# Patient Record
Sex: FEMALE | Race: ASIAN | NOT HISPANIC OR LATINO
[De-identification: names, ages, dates, MRNs, and addresses within clinical notes are randomized per-mention and may not be internally consistent; named-entity substitution may affect disease eponyms.]

---

## 2017-01-18 ENCOUNTER — APPOINTMENT (OUTPATIENT)
Dept: CARDIOLOGY | Facility: CLINIC | Age: 77
End: 2017-01-18

## 2019-08-29 ENCOUNTER — INPATIENT (INPATIENT)
Facility: HOSPITAL | Age: 79
LOS: 4 days | Discharge: HOME | DRG: 291 | End: 2019-09-03
Attending: INTERNAL MEDICINE | Admitting: INTERNAL MEDICINE
Payer: COMMERCIAL

## 2019-08-29 VITALS
DIASTOLIC BLOOD PRESSURE: 60 MMHG | OXYGEN SATURATION: 98 % | HEART RATE: 88 BPM | WEIGHT: 117.95 LBS | RESPIRATION RATE: 13 BRPM | SYSTOLIC BLOOD PRESSURE: 182 MMHG | TEMPERATURE: 98 F

## 2019-08-29 DIAGNOSIS — D64.9 ANEMIA, UNSPECIFIED: ICD-10-CM

## 2019-08-29 DIAGNOSIS — E11.9 TYPE 2 DIABETES MELLITUS WITHOUT COMPLICATIONS: ICD-10-CM

## 2019-08-29 DIAGNOSIS — Z90.49 ACQUIRED ABSENCE OF OTHER SPECIFIED PARTS OF DIGESTIVE TRACT: Chronic | ICD-10-CM

## 2019-08-29 DIAGNOSIS — I10 ESSENTIAL (PRIMARY) HYPERTENSION: ICD-10-CM

## 2019-08-29 DIAGNOSIS — N17.9 ACUTE KIDNEY FAILURE, UNSPECIFIED: ICD-10-CM

## 2019-08-29 DIAGNOSIS — E78.00 PURE HYPERCHOLESTEROLEMIA, UNSPECIFIED: ICD-10-CM

## 2019-08-29 DIAGNOSIS — I25.10 ATHEROSCLEROTIC HEART DISEASE OF NATIVE CORONARY ARTERY WITHOUT ANGINA PECTORIS: ICD-10-CM

## 2019-08-29 DIAGNOSIS — Z29.9 ENCOUNTER FOR PROPHYLACTIC MEASURES, UNSPECIFIED: ICD-10-CM

## 2019-08-29 DIAGNOSIS — N19 UNSPECIFIED KIDNEY FAILURE: ICD-10-CM

## 2019-08-29 DIAGNOSIS — I50.9 HEART FAILURE, UNSPECIFIED: ICD-10-CM

## 2019-08-29 DIAGNOSIS — Z90.5 ACQUIRED ABSENCE OF KIDNEY: Chronic | ICD-10-CM

## 2019-08-29 LAB
ALBUMIN SERPL ELPH-MCNC: 3 G/DL — LOW (ref 3.3–5)
ALP SERPL-CCNC: 117 U/L — SIGNIFICANT CHANGE UP (ref 40–120)
ALT FLD-CCNC: 39 U/L — SIGNIFICANT CHANGE UP (ref 12–78)
ANION GAP SERPL CALC-SCNC: 11 MMOL/L — SIGNIFICANT CHANGE UP (ref 5–17)
APPEARANCE UR: CLEAR — SIGNIFICANT CHANGE UP
APTT BLD: 38.2 SEC — HIGH (ref 28.5–37)
AST SERPL-CCNC: 38 U/L — HIGH (ref 15–37)
BASOPHILS # BLD AUTO: 0.09 K/UL — SIGNIFICANT CHANGE UP (ref 0–0.2)
BASOPHILS NFR BLD AUTO: 1.1 % — SIGNIFICANT CHANGE UP (ref 0–2)
BILIRUB SERPL-MCNC: 0.4 MG/DL — SIGNIFICANT CHANGE UP (ref 0.2–1.2)
BILIRUB UR-MCNC: NEGATIVE — SIGNIFICANT CHANGE UP
BUN SERPL-MCNC: 51 MG/DL — HIGH (ref 7–23)
CALCIUM SERPL-MCNC: 8.8 MG/DL — SIGNIFICANT CHANGE UP (ref 8.5–10.1)
CHLORIDE SERPL-SCNC: 116 MMOL/L — HIGH (ref 96–108)
CK MB BLD-MCNC: 2.9 % — SIGNIFICANT CHANGE UP (ref 0–3.5)
CK MB BLD-MCNC: 3 % — SIGNIFICANT CHANGE UP (ref 0–3.5)
CK MB CFR SERPL CALC: 7 NG/ML — HIGH (ref 0–3.6)
CK MB CFR SERPL CALC: 7.3 NG/ML — HIGH (ref 0–3.6)
CK SERPL-CCNC: 235 U/L — HIGH (ref 26–192)
CK SERPL-CCNC: 256 U/L — HIGH (ref 26–192)
CO2 SERPL-SCNC: 18 MMOL/L — LOW (ref 22–31)
COLOR SPEC: SIGNIFICANT CHANGE UP
CREAT SERPL-MCNC: 3.7 MG/DL — HIGH (ref 0.5–1.3)
DIFF PNL FLD: ABNORMAL
EOSINOPHIL # BLD AUTO: 0.61 K/UL — HIGH (ref 0–0.5)
EOSINOPHIL NFR BLD AUTO: 7.5 % — HIGH (ref 0–6)
GLUCOSE SERPL-MCNC: 155 MG/DL — HIGH (ref 70–99)
GLUCOSE UR QL: 50 MG/DL
HCT VFR BLD CALC: 30.7 % — LOW (ref 34.5–45)
HGB BLD-MCNC: 9.2 G/DL — LOW (ref 11.5–15.5)
IMM GRANULOCYTES NFR BLD AUTO: 0.5 % — SIGNIFICANT CHANGE UP (ref 0–1.5)
INR BLD: 1.08 RATIO — SIGNIFICANT CHANGE UP (ref 0.88–1.16)
KETONES UR-MCNC: NEGATIVE — SIGNIFICANT CHANGE UP
LACTATE SERPL-SCNC: 1.3 MMOL/L — SIGNIFICANT CHANGE UP (ref 0.7–2)
LEUKOCYTE ESTERASE UR-ACNC: NEGATIVE — SIGNIFICANT CHANGE UP
LYMPHOCYTES # BLD AUTO: 1.39 K/UL — SIGNIFICANT CHANGE UP (ref 1–3.3)
LYMPHOCYTES # BLD AUTO: 17.1 % — SIGNIFICANT CHANGE UP (ref 13–44)
MCHC RBC-ENTMCNC: 27.1 PG — SIGNIFICANT CHANGE UP (ref 27–34)
MCHC RBC-ENTMCNC: 30 GM/DL — LOW (ref 32–36)
MCV RBC AUTO: 90.6 FL — SIGNIFICANT CHANGE UP (ref 80–100)
MONOCYTES # BLD AUTO: 0.57 K/UL — SIGNIFICANT CHANGE UP (ref 0–0.9)
MONOCYTES NFR BLD AUTO: 7 % — SIGNIFICANT CHANGE UP (ref 2–14)
NEUTROPHILS # BLD AUTO: 5.43 K/UL — SIGNIFICANT CHANGE UP (ref 1.8–7.4)
NEUTROPHILS NFR BLD AUTO: 66.8 % — SIGNIFICANT CHANGE UP (ref 43–77)
NITRITE UR-MCNC: NEGATIVE — SIGNIFICANT CHANGE UP
NRBC # BLD: 0 /100 WBCS — SIGNIFICANT CHANGE UP (ref 0–0)
NT-PROBNP SERPL-SCNC: HIGH PG/ML (ref 0–450)
PH UR: 5 — SIGNIFICANT CHANGE UP (ref 5–8)
PLATELET # BLD AUTO: 366 K/UL — SIGNIFICANT CHANGE UP (ref 150–400)
POTASSIUM SERPL-MCNC: 5 MMOL/L — SIGNIFICANT CHANGE UP (ref 3.5–5.3)
POTASSIUM SERPL-SCNC: 5 MMOL/L — SIGNIFICANT CHANGE UP (ref 3.5–5.3)
PROT SERPL-MCNC: 7.4 G/DL — SIGNIFICANT CHANGE UP (ref 6–8.3)
PROT UR-MCNC: 500 MG/DL
PROTHROM AB SERPL-ACNC: 12.3 SEC — SIGNIFICANT CHANGE UP (ref 10–12.9)
RBC # BLD: 3.39 M/UL — LOW (ref 3.8–5.2)
RBC # FLD: 18.3 % — HIGH (ref 10.3–14.5)
SODIUM SERPL-SCNC: 145 MMOL/L — SIGNIFICANT CHANGE UP (ref 135–145)
SP GR SPEC: 1 — LOW (ref 1.01–1.02)
TROPONIN I SERPL-MCNC: 0.08 NG/ML — HIGH (ref 0.01–0.04)
TROPONIN I SERPL-MCNC: 0.09 NG/ML — HIGH (ref 0.01–0.04)
TROPONIN I SERPL-MCNC: 0.09 NG/ML — HIGH (ref 0.01–0.04)
UROBILINOGEN FLD QL: NEGATIVE — SIGNIFICANT CHANGE UP
WBC # BLD: 8.13 K/UL — SIGNIFICANT CHANGE UP (ref 3.8–10.5)
WBC # FLD AUTO: 8.13 K/UL — SIGNIFICANT CHANGE UP (ref 3.8–10.5)

## 2019-08-29 PROCEDURE — 99223 1ST HOSP IP/OBS HIGH 75: CPT

## 2019-08-29 PROCEDURE — 71045 X-RAY EXAM CHEST 1 VIEW: CPT | Mod: 26

## 2019-08-29 PROCEDURE — 93010 ELECTROCARDIOGRAM REPORT: CPT

## 2019-08-29 PROCEDURE — 99285 EMERGENCY DEPT VISIT HI MDM: CPT

## 2019-08-29 PROCEDURE — 93306 TTE W/DOPPLER COMPLETE: CPT | Mod: 26

## 2019-08-29 RX ORDER — DEXTROSE 50 % IN WATER 50 %
25 SYRINGE (ML) INTRAVENOUS ONCE
Refills: 0 | Status: DISCONTINUED | OUTPATIENT
Start: 2019-08-29 | End: 2019-09-03

## 2019-08-29 RX ORDER — GLUCAGON INJECTION, SOLUTION 0.5 MG/.1ML
1 INJECTION, SOLUTION SUBCUTANEOUS ONCE
Refills: 0 | Status: DISCONTINUED | OUTPATIENT
Start: 2019-08-29 | End: 2019-09-03

## 2019-08-29 RX ORDER — ASPIRIN/CALCIUM CARB/MAGNESIUM 324 MG
1 TABLET ORAL
Qty: 0 | Refills: 0 | DISCHARGE

## 2019-08-29 RX ORDER — ATORVASTATIN CALCIUM 80 MG/1
80 TABLET, FILM COATED ORAL AT BEDTIME
Refills: 0 | Status: DISCONTINUED | OUTPATIENT
Start: 2019-08-29 | End: 2019-09-03

## 2019-08-29 RX ORDER — HYDRALAZINE HCL 50 MG
50 TABLET ORAL
Qty: 0 | Refills: 0 | DISCHARGE

## 2019-08-29 RX ORDER — AMLODIPINE BESYLATE 2.5 MG/1
10 TABLET ORAL DAILY
Refills: 0 | Status: DISCONTINUED | OUTPATIENT
Start: 2019-08-29 | End: 2019-09-03

## 2019-08-29 RX ORDER — FUROSEMIDE 40 MG
40 TABLET ORAL ONCE
Refills: 0 | Status: COMPLETED | OUTPATIENT
Start: 2019-08-29 | End: 2019-08-29

## 2019-08-29 RX ORDER — HYDRALAZINE HCL 50 MG
0 TABLET ORAL
Qty: 0 | Refills: 0 | DISCHARGE

## 2019-08-29 RX ORDER — HYDRALAZINE HCL 50 MG
50 TABLET ORAL THREE TIMES A DAY
Refills: 0 | Status: DISCONTINUED | OUTPATIENT
Start: 2019-08-29 | End: 2019-09-03

## 2019-08-29 RX ORDER — ASPIRIN/CALCIUM CARB/MAGNESIUM 324 MG
81 TABLET ORAL DAILY
Refills: 0 | Status: DISCONTINUED | OUTPATIENT
Start: 2019-08-29 | End: 2019-09-03

## 2019-08-29 RX ORDER — INSULIN GLARGINE 100 [IU]/ML
3 INJECTION, SOLUTION SUBCUTANEOUS
Qty: 0 | Refills: 0 | DISCHARGE

## 2019-08-29 RX ORDER — HYDRALAZINE HCL 50 MG
50 TABLET ORAL THREE TIMES A DAY
Refills: 0 | Status: DISCONTINUED | OUTPATIENT
Start: 2019-08-29 | End: 2019-08-29

## 2019-08-29 RX ORDER — DEXTROSE 50 % IN WATER 50 %
15 SYRINGE (ML) INTRAVENOUS ONCE
Refills: 0 | Status: DISCONTINUED | OUTPATIENT
Start: 2019-08-29 | End: 2019-09-03

## 2019-08-29 RX ORDER — AMLODIPINE BESYLATE 2.5 MG/1
1 TABLET ORAL
Qty: 0 | Refills: 0 | DISCHARGE

## 2019-08-29 RX ORDER — ATORVASTATIN CALCIUM 80 MG/1
1 TABLET, FILM COATED ORAL
Qty: 0 | Refills: 0 | DISCHARGE

## 2019-08-29 RX ORDER — INSULIN LISPRO 100/ML
VIAL (ML) SUBCUTANEOUS AT BEDTIME
Refills: 0 | Status: DISCONTINUED | OUTPATIENT
Start: 2019-08-29 | End: 2019-09-03

## 2019-08-29 RX ORDER — SODIUM CHLORIDE 9 MG/ML
1000 INJECTION, SOLUTION INTRAVENOUS
Refills: 0 | Status: DISCONTINUED | OUTPATIENT
Start: 2019-08-29 | End: 2019-09-03

## 2019-08-29 RX ORDER — FUROSEMIDE 40 MG
40 TABLET ORAL EVERY 12 HOURS
Refills: 0 | Status: DISCONTINUED | OUTPATIENT
Start: 2019-08-29 | End: 2019-08-31

## 2019-08-29 RX ORDER — AMLODIPINE BESYLATE 2.5 MG/1
10 TABLET ORAL DAILY
Refills: 0 | Status: DISCONTINUED | OUTPATIENT
Start: 2019-08-29 | End: 2019-08-29

## 2019-08-29 RX ORDER — CLOPIDOGREL BISULFATE 75 MG/1
1 TABLET, FILM COATED ORAL
Qty: 0 | Refills: 0 | DISCHARGE

## 2019-08-29 RX ORDER — ENOXAPARIN SODIUM 100 MG/ML
30 INJECTION SUBCUTANEOUS DAILY
Refills: 0 | Status: DISCONTINUED | OUTPATIENT
Start: 2019-08-29 | End: 2019-08-30

## 2019-08-29 RX ORDER — CLOPIDOGREL BISULFATE 75 MG/1
0 TABLET, FILM COATED ORAL
Qty: 0 | Refills: 0 | DISCHARGE

## 2019-08-29 RX ORDER — INSULIN LISPRO 100/ML
VIAL (ML) SUBCUTANEOUS
Refills: 0 | Status: DISCONTINUED | OUTPATIENT
Start: 2019-08-29 | End: 2019-09-03

## 2019-08-29 RX ORDER — DEXTROSE 50 % IN WATER 50 %
12.5 SYRINGE (ML) INTRAVENOUS ONCE
Refills: 0 | Status: DISCONTINUED | OUTPATIENT
Start: 2019-08-29 | End: 2019-09-03

## 2019-08-29 RX ADMIN — Medication 40 MILLIGRAM(S): at 21:40

## 2019-08-29 RX ADMIN — Medication 40 MILLIGRAM(S): at 10:43

## 2019-08-29 RX ADMIN — AMLODIPINE BESYLATE 10 MILLIGRAM(S): 2.5 TABLET ORAL at 16:18

## 2019-08-29 RX ADMIN — ATORVASTATIN CALCIUM 80 MILLIGRAM(S): 80 TABLET, FILM COATED ORAL at 21:40

## 2019-08-29 RX ADMIN — Medication 50 MILLIGRAM(S): at 21:40

## 2019-08-29 NOTE — CONSULT NOTE ADULT - ASSESSMENT
Assessment/Plan:  79y Female    Mindy Lozoyaad [facility]  Cardiology  Spectra #[phone] Assessment/Plan:  This is a 78 y/o female current smoker with Hx of non-Hodgkin's lymphoma (last chemo in 2009), right kidney Ca s/p nephrectomy (2009), CAD s/p stents to the RCA and LAD x 2 (20019), HTN, DM2, HLD, and CKD stage 4 (s/p left AVG  not has been used) presented to the ED c/o worsening SOb, MO, and orthopnea.  denies CP or palpitations.  Denies nausea, vomiting, or diarrhea.  Denies fever or cough.  Admits to have just arrived less that a week ago from [place] and that her SOB and MO started while she was there, though, not as bad.     HF with unknown EF  - She had a remote TTE (2009) showing a normal LVSF with no significant valvular pathology  - Her CxR, pro-BNP, and clinical exam are consistent of volume overload.  This is probably aggravated by her elevated BP  - She is s/p Lasix 40 mg IV x 1 in the ED.  Will aggressively diurese for now and reassess volume status closely  - Monitor strict I&O's  - Daily standing weights only  - Monitor electrolytes, replete to keep K>4 and Mag>2  - Obtain TTE    CAD  - She has no acute anginal symptoms  - She is s/p stents (RCA, LAD x 2) in 2009  - Continue ASA  - Will not resume Plavix, although, she has been taking it since the PCI and just recently stopped it  - Continue statin  - Her troponin is slightly elevated at .084.  Continue to trend x 2 more.  This is likely in the setting of her CKD and volume overload    CKD  - Her outpatient creat in 7/2019 =4.9; 8/2019 =3.13.  Here in ED=3.7  - Continue to monitor renal indices  - Avoid nephrotoxics  - She had an AV graft that has not been used yet  - Renal following.  Per Renal, no indication for HD right now.    HTN  - Continue Hydralazine 50mg q8H (home med) for afterload reduction.  May increase if BP remains elevated    HLD  - Continue statin  - Will obtain FLP    DM2  - Obtain HgbA1C  - FS AC and HS  - Care per Primary    Anemia  - She admits to a +FOBT (outpatient)  - She was scheduled for an outpatient colonoscopy  - She presented with Hgb of 9.2.  Will continue to trend.  - Recommend GI eval  - Will continue ASA for now in setting of cardiac stents.  In the event that she is significant bleeding or drop in Hgb, then will hold but will resume as soon as GI clears    DVT ppx  - Per Primary    [facility]  Cardiology  Spectra #[phone] Assessment/Plan:  This is a 80 y/o female current smoker with Hx of non-Hodgkin's lymphoma (last chemo in 2009), right kidney Ca s/p nephrectomy (2009), CAD s/p stents to the RCA and LAD x 2 (20019), HTN, DM2, HLD, and CKD stage 4 (s/p left AVG  not has been used) presented to the ED c/o worsening SOb, MO, and orthopnea.  denies CP or palpitations.  Denies nausea, vomiting, or diarrhea.  Denies fever or cough.  Admits to have just arrived less that a week ago from [place] and that her SOB and MO started while she was there, though, not as bad.     HF with unknown EF  - She had a remote TTE (2009) showing a normal LVSF with no significant valvular pathology  - Her CxR, pro-BNP, and clinical exam are consistent of volume overload.  This is probably aggravated by her elevated BP  - She is s/p Lasix 40 mg IV x 1 in the ED.  Will aggressively diurese for now and reassess volume status closely.  Agree with Lasix 40 mg IV q12H  - Monitor strict I&O's  - Daily standing weights only  - Monitor electrolytes, replete to keep K>4 and Mag>2  - Obtain TTE  - Can be ruled out for DVT/PE, though, doubt.  Can add    CAD  - She has no acute anginal symptoms  - She is s/p stents (RCA, LAD x 2) in 2009  - Continue ASA  - Will not resume Plavix, although, she has been taking it since the PCI and just recently stopped it  - Continue statin  - Her troponin is slightly elevated at .084.  Continue to trend x 2 more.  This is likely in the setting of her CKD and volume overload    CKD  - Her outpatient creat in 7/2019 =4.9; 8/2019 =3.13.  Here in ED=3.7  - Continue to monitor renal indices  - Avoid nephrotoxics  - She had an AV graft that has not been used yet  - Renal following.  Per Renal, no indication for HD right now.    HTN  - Continue Hydralazine 50mg q8H (home med) for afterload reduction.  May increase if BP remains elevated    HLD  - Continue statin  - Will obtain FLP    DM2  - Obtain HgbA1C  - FS AC and HS  - Care per Primary    Anemia  - She admits to a +FOBT (outpatient)  - She was scheduled for an outpatient colonoscopy  - She presented with Hgb of 9.2.  Will continue to trend.  - Recommend GI eval  - Will continue ASA for now in setting of cardiac stents.  In the event that she is significant bleeding or drop in Hgb, then will hold but will resume as soon as GI clears    DVT ppx  - Per Primary    Mindy Young [facility]  Cardiology  Spectra #[phone] Assessment/Plan:  This is a 80 y/o female current smoker with Hx of non-Hodgkin's lymphoma (last chemo in 2009), right kidney Ca s/p nephrectomy (2009), CAD s/p stents to the RCA and LAD x 2 (20019), HTN, DM2, HLD, and CKD stage 4 (s/p left AVG not has been used) presented to the ED c/o worsening SOB, MO, and orthopnea.  Admits to have just arrived less that a week ago from [place] and that her SOB and MO started while she was there.    HF with unknown EF  - She had a remote TTE (2009) showing a normal LVSF with no significant valvular pathology  - Her CxR, pro-BNP, and clinical exam are consistent of volume overload.  This is probably aggravated by her elevated BP  - She is s/p Lasix 40 mg IV x 1 in the ED.  Will aggressively diurese for now and reassess volume status closely.  Agree with Lasix 40 mg IV q12H  - Monitor strict I&O's  - Daily standing weights only  - Monitor electrolytes, replete to keep K>4 and Mag>2  - Obtain TTE  - Can be ruled out for DVT/PE, though, doubt.      CAD  - She has no acute anginal symptoms  - She is s/p stents (RCA, LAD x 2) in 2009  - Continue ASA  - Will not resume Plavix, although, she has been taking it since the PCI and just recently stopped it  - Continue statin  - Her troponin is slightly elevated at .084.  Continue to trend x 2 more.  This is likely in the setting of her CKD and volume overload    CKD  - Her outpatient creat in 7/2019 =4.9; 8/2019 =3.13.  Here in ED=3.7  - Continue to monitor renal indices  - Avoid nephrotoxics  - She had an AV graft that has not been used yet  - Renal following.  Per Renal, no indication for HD right now.    HTN  - Continue Hydralazine 50mg q8H (home med) for afterload reduction.  May increase if BP remains elevated    HLD  - Continue statin  - Will obtain FLP    DM2  - Obtain HgbA1C  - FS AC and HS  - Care per Primary    Anemia  - She admits to a +FOBT (outpatient)  - She was scheduled for an outpatient colonoscopy  - She presented with Hgb of 9.2.  Will continue to trend.  - Recommend GI eval  - Will continue ASA for now in setting of cardiac stents.  In the event that she significantly bleeds or drop in Hgb, then will hold but will resume as soon as GI clears    DVT ppx  - Per Primary    Mindy Young [facility]  Cardiology  Spectra #[phone]

## 2019-08-29 NOTE — ED PROVIDER NOTE - CLINICAL SUMMARY MEDICAL DECISION MAKING FREE TEXT BOX
CRF with SOB for several days. Predialysis. Plan - CXR, labs, lasix, renal consult. CRF with SOB for several days. Predialysis. Plan - CXR, labs, lasix, renal consult.  Will hold off on sepsis fluids due to renal failure.

## 2019-08-29 NOTE — CONSULT NOTE ADULT - ASSESSMENT
1.	CKD 4  2.	Dyspnea, Fluid overload  3.	Diabetes  4.	Hypertension  5.	Anemia    IV lasix. To continue PO lasix as out pt. Monitor blood sugar levels. Insulin coverage as needed. Dietary restriction.   Monitor BP trend. Titrate BP meds as needed. Salt restriction. Monitor h/h trend. Check iron studies if not done recently. Transfuse PRBC's PRN.   Will follow electrolytes and renal function trend. No indication for RRT at this time.   Further recommendations pending clinical course. Thank you for the courtesy of this referral.

## 2019-08-29 NOTE — H&P ADULT - HISTORY OF PRESENT ILLNESS
Patient is a 79y old  Female who presents with a chief complaint of volume overload (29 Aug 2019 12:52)      FROM ADMISSION H+P:   HPI:      ----  INTERVAL HPI/OVERNIGHT EVENTS: Pt seen and evaluated at the bedside. No acute overnight events occurred.     ----  PAST MEDICAL & SURGICAL HISTORY:  Non-Hodgkins lymphoma  High cholesterol  DM (diabetes mellitus)  HTN (hypertension)  Renal failure      FAMILY HISTORY:      Allergies    No Known Allergies    Intolerances        ----  REVIEW OF SYSTEMS:  CONSTITUTIONAL: denies fever, chills, fatigue, weakness  HEENT: denies blurred vision, sore throat  SKIN: denies new lesions, rash  CARDIOVASCULAR: denies chest pain, chest pressure, palpitations  RESPIRATORY: denies shortness of breath, sputum production  GASTROINTESTINAL: denies nausea, vomiting, diarrhea, abdominal pain  GENITOURINARY: denies dysuria, discharge  NEUROLOGICAL: denies numbness, headache, focal weakness  MUSCULOSKELETAL: denies new joint pain, muscle aches  HEMATOLOGIC: denies gross bleeding, bruising  LYMPHATICS: denies enlarged lymph nodes, extremity swelling  PSYCHIATRIC: denies recent changes in anxiety, depression  ENDOCRINOLOGIC: denies sweating, cold or heat intolerance    ----  PHYSICAL EXAM:  GENERAL: patient appears well, no acute distress, appropriately interactive  EYES: sclera clear, no exudates  ENMT: oropharynx clear without erythema, moist mucous membranes  NECK: supple, soft, no thyromegaly noted  LUNGS: good air entry bilaterally, clear to auscultation, symmetric breath sounds, no wheezing or rhonchi appreciated  HEART: soft S1/S2, regular rate and rhythm, no murmurs noted, no noted edema to b/l LE  GASTROINTESTINAL: abdomen is soft, nontender, nondistended, normoactive bowel sounds, no palpable masses  INTEGUMENT: good skin turgor, appropriate for ethnicity, appears well perfused, no jaundice noted  MUSCULOSKELETAL: no clubbing or cyanosis, no obvious deformity  NEUROLOGIC: awake, alert, oriented x3, good muscle tone in 4 extremities, no obvious sensory deficits  PSYCHIATRIC: mood is good, affect is congruent with mood, linear and logical thought process  HEME/LYMPH: no palpable supraclavicular nodules, no obvious ecchymosis     T(C): 36.7 (08-29-19 @ 14:54), Max: 36.7 (08-29-19 @ 14:54)  HR: 78 (08-29-19 @ 14:54) (78 - 88)  BP: 170/76 (08-29-19 @ 14:54) (168/78 - 182/60)  RR: 16 (08-29-19 @ 14:54) (13 - 16)  SpO2: 97% (08-29-19 @ 14:54) (97% - 98%)  Wt(kg): --    ----  I&O's Summary      LABS:                        9.2    8.13  )-----------( 366      ( 29 Aug 2019 10:30 )             30.7     08-29    145  |  116<H>  |  51<H>  ----------------------------<  155<H>  5.0   |  18<L>  |  3.70<H>    Ca    8.8      29 Aug 2019 10:30    TPro  7.4  /  Alb  3.0<L>  /  TBili  0.4  /  DBili  x   /  AST  38<H>  /  ALT  39  /  AlkPhos  117  08-29    PT/INR - ( 29 Aug 2019 10:30 )   PT: 12.3 sec;   INR: 1.08 ratio         PTT - ( 29 Aug 2019 10:30 )  PTT:38.2 sec    CAPILLARY BLOOD GLUCOSE Pt is a 78 yo F w/ PMH Non-Hodgkins lymphoma, High cholesterol, DM (on insulin),HTN, chronic Renal failure, angioplasty 2009, removal of R kidney and gall bladder 2011, chemo 2011 that presents to [facility] ED c/o SOB for the past 3-4 days. She explains that she has been traveling recently. She just got back from [place]. Her shortness of breath began during her trip. She explains she has never had this before. She was having trouble walking long distances before becoming out of breath and needing to stop walking. She rates the SOB 7/10. She also admits to leg swelling, LE spasms/pain, orthopnea and palpations. She denies abdominal pain, chest pain, dyspnea, numbness, tingling, syncope, dizziness. Pt is a 78 yo F w/ PMH Non-Hodgkins lymphoma, High cholesterol, DM (on insulin),HTN, chronic Renal failure, angioplasty 2009, removal of R kidney and gall bladder 2011, chemo 2011 that presents to [facility] ED c/o SOB for the past 3-4 days. She explains that she has been traveling recently. She just got back from [place]. Her shortness of breath began during her trip. She explains she has never had this before. She was having trouble walking long distances before becoming out of breath and needing to stop walking. She rates the SOB 7/10. She also admits to leg swelling, LE spasms/pain, orthopnea and palpations. She denies abdominal pain, chest pain, dyspnea, numbness, tingling, syncope, dizziness.     In ED /60, HR 88, SPO2 98 on room air. No leukocytosis, afebrile.   BUN 51, Cr 3.7, Lactate 1.3, BNP 16,687, trop .084, H/H 9.2/30.7  EKG: Sinus rhythm, PVCs, PACs. LVH. LA enlargement.   CXR: small b/l pleural effusions and bibasilar atelectasis. Pt is a 80 yo F w/ PMH Non-Hodgkins lymphoma, High cholesterol, DM (on insulin),HTN, chronic Renal failure, angioplasty 2009, removal of R kidney and gall bladder 2011, chemo 2011 that presents to [facility] ED c/o SOB for the past 3-4 days. She explains that she has been traveling recently. She just got back from [place]. Her shortness of breath began during her trip. She explains she has never had this before. She was having trouble walking long distances before becoming out of breath and needing to stop walking. She rates the SOB 7/10. She also admits to leg swelling, LE spasms/pain, orthopnea and palpations. She recently had a positive FOBT outpatient and was scheduled for a colonoscopy but has yet to receive it. She denies abdominal pain, chest pain, dyspnea, numbness, tingling, syncope, dizziness.     In ED /60, HR 88, SPO2 98 on room air. No leukocytosis, afebrile.   BUN 51, Cr 3.7, Lactate 1.3, BNP 16,687, trop .084, H/H 9.2/30.7  EKG: Sinus rhythm, PVCs, PACs. LVH. LA enlargement.   CXR: small b/l pleural effusions and bibasilar atelectasis.

## 2019-08-29 NOTE — H&P ADULT - PROBLEM SELECTOR PLAN 1
-Pt BNP 16,687 in ED  -Pt appears fluid overloaded w/ orthopnea, SOB and LE pitting edema.  -CXR revealed b/l pleaural effusion and bibasilar atelectasis.  -Cardio consulted, Dr. Franco  -Cardio rec IV 40mg Lasix Q 12.  -Cardio rec obtain TTE, previous was 2009 w/ EF 75. -SOB likely 2/2 to acute CHF exacerbation, pt has no history of CHF.   -Previous TTE in 2009, EF 75%  -Pt BNP 16,687 in ED  -Pt appears fluid overloaded w/ orthopnea, SOB and LE pitting edema.  -CXR revealed b/l pleural effusion and bibasilar atelectasis.  -Cardio consulted, Dr. Franco  -Cardio rec IV 40mg Lasix Q 12, appreciate recs.  -Cardio rec obtain TTE -SOB likely 2/2 to acute CHF exacerbation, pt has no history of CHF.   -Previous TTE in 2009, EF 75%  -Pt BNP 16,687 in ED  -Pt appears fluid overloaded w/ orthopnea, SOB and LE pitting edema.  -CXR revealed b/l pleural effusion and bibasilar atelectasis.  -Cardio consulted, Dr. Franco  -Cardio rec IV 40mg Lasix Q 12, appreciate recs.  -Cardio rec obtain TTE, f/u results.

## 2019-08-29 NOTE — H&P ADULT - NSHPSOCIALHISTORY_GEN_ALL_CORE
Pt lives at home with daughter. Recent travel. Current smoker, 2-3 cigarettes per day. No alcohol or drug use. Ambulates independently w/ walker.

## 2019-08-29 NOTE — H&P ADULT - NSICDXPASTMEDICALHX_GEN_ALL_CORE_FT
PAST MEDICAL HISTORY:  CAD S/P percutaneous coronary angioplasty     DM (diabetes mellitus)     High cholesterol     HTN (hypertension)     Non-Hodgkins lymphoma     Renal failure

## 2019-08-29 NOTE — H&P ADULT - PROBLEM SELECTOR PLAN 7
-continue home dose asa  -do not restart Clopidogrel as per cardio  -trop was 0.084, trending. -continue home dose asa  -do not restart Clopidogrel as per cardio  -trop was 0.084, trending.  -repeat EKG AM -continue home dose asa  -do not restart Clopidogrel as per cardio  -trop was 0.084, trending cardiac enzymes Q6 x2, f/u results.   -repeat EKG AM

## 2019-08-29 NOTE — H&P ADULT - PROBLEM SELECTOR PLAN 2
-BUN/Cr: 51/3.7, baseline 7/2019 3-5.   -Acute on chronic renal failure  -Monitor BMP  -Nephro consulted, f/u recs  -avoid nephrotoxic meds -BUN/Cr: 51/3.7, baseline 7/2019 3-5.   -Acute on chronic renal failure  -Renal diet, fluid restriction to 2 L  -Monitor BMP, Cr, K in AM  -Nephro consulted, f/u recs  -avoid nephrotoxic meds -BUN/Cr: 51/3.7, baseline 7/2019 ranges from 3-5.   -Acute on chronic renal failure  -Renal diet, fluid restriction to 2 L  -Monitor BMP, Cr, K in AM  -Nephro consulted, f/u recs  -avoid nephrotoxic meds

## 2019-08-29 NOTE — ED PROVIDER NOTE - CHPI ED SYMPTOMS NEG
no vomiting/no chills/no nausea/no syncope/no back pain/no chest pain/no cough/no dizziness/no fever/no diaphoresis

## 2019-08-29 NOTE — ED PROVIDER NOTE - OBJECTIVE STATEMENT
79 F with CRF, SP nephrectomy, has AV graft in left arm which hasn't been used yet, co SOB for 3 days. Denies fever, cough, CP, N, V, or other symptom.   PCP Hallie Jeffery

## 2019-08-29 NOTE — H&P ADULT - PROBLEM SELECTOR PLAN 8
-DVT prophylaxis, SCDs for now due to positive FOBT.  IMPROVE VTE Individual Risk Assessment    RISK                                                                Points  [  ] Previous VTE                                                  3  [  ] Thrombophilia                                               2  [  ] Lower limb paralysis                                      2        (unable to hold up >15 seconds)    [  ] Current Cancer                                              2         (within 6 months)  [  ] Immobilization > 24 hrs                                1  [  ] ICU/CCU stay > 24 hours                              1  [  ] Age > 60                                                      1  IMPROVE VTE Score ____1_____  IMPROVE Score 0-1: Low Risk, No VTE prophylaxis required for most patients, encourage ambulation.   IMPROVE Score 2-3: At risk, pharmacologic VTE prophylaxis is indicated for most patients (in the absence of a contraindication)  IMPROVE Score > or = 4: High Risk, pharmacologic VTE prophylaxis is indicated for most patients (in the absence of a contraindication) -DVT prophylaxis, Lovenox 30 mg  IMPROVE VTE Individual Risk Assessment  RISK                                                                Points  [  ] Previous VTE                                                  3  [  ] Thrombophilia                                               2  [  ] Lower limb paralysis                                      2        (unable to hold up >15 seconds)    [  ] Current Cancer                                              2         (within 6 months)  [  ] Immobilization > 24 hrs                                1  [  ] ICU/CCU stay > 24 hours                              1  [  ] Age > 60                                                      1  IMPROVE VTE Score ____1_____  IMPROVE Score 0-1: Low Risk, No VTE prophylaxis required for most patients, encourage ambulation.   IMPROVE Score 2-3: At risk, pharmacologic VTE prophylaxis is indicated for most patients (in the absence of a contraindication)  IMPROVE Score > or = 4: High Risk, pharmacologic VTE prophylaxis is indicated for most patients (in the absence of a contraindication) -DVT prophylaxis renal dosing, Lovenox 30 mg  IMPROVE VTE Individual Risk Assessment  RISK                                                                Points  [  ] Previous VTE                                                  3  [  ] Thrombophilia                                               2  [  ] Lower limb paralysis                                      2        (unable to hold up >15 seconds)    [  ] Current Cancer                                              2         (within 6 months)  [  ] Immobilization > 24 hrs                                1  [  ] ICU/CCU stay > 24 hours                              1  [  ] Age > 60                                                      1  IMPROVE VTE Score ____1_____  IMPROVE Score 0-1: Low Risk, No VTE prophylaxis required for most patients, encourage ambulation.   IMPROVE Score 2-3: At risk, pharmacologic VTE prophylaxis is indicated for most patients (in the absence of a contraindication)  IMPROVE Score > or = 4: High Risk, pharmacologic VTE prophylaxis is indicated for most patients (in the absence of a contraindication)

## 2019-08-29 NOTE — ED PROVIDER NOTE - CARE PLAN
Principal Discharge DX:	Renal failure Principal Discharge DX:	Renal failure  Secondary Diagnosis:	CHF (congestive heart failure)

## 2019-08-29 NOTE — H&P ADULT - PROBLEM SELECTOR PLAN 6
-continue home dose amlodipine, hydralazine.  -monitor bp -continue home dose amlodipine, hydralazine w/ hold parameters.  -monitor bp

## 2019-08-29 NOTE — CONSULT NOTE ADULT - SUBJECTIVE AND OBJECTIVE BOX
[facility] - Flaca Summers, Missael Lombardo, Adan Roper Savella, [place]  Office Number: [phone]    Initial Consult Note:     CHIEF COMPLAINT: Patient is a 79y old  Female who presents with a chief complaint of     HPI:    PAST MEDICAL & SURGICAL HISTORY:  Non-Hodgkins lymphoma  High cholesterol  DM (diabetes mellitus)  HTN (hypertension)  Renal failure    SOCIAL HISTORY:  No tobacco, ethanol, or drug abuse.  FAMILY HISTORY:    No family history of acute MI or sudden cardiac death.  MEDICATIONS  (STANDING):    MEDICATIONS  (PRN):    Allergies    No Known Allergies    Intolerances    REVIEW OF SYSTEMS:  CONSTITUTIONAL: No weakness, fevers or chills  EYES/ENT: No visual changes;  No vertigo or throat pain   NECK: No pain or stiffness  RESPIRATORY: No cough, wheezing, hemoptysis; No shortness of breath  CARDIOVASCULAR: No chest pain or palpitations  GASTROINTESTINAL: No abdominal pain. No nausea, vomiting, or hematemesis; No diarrhea or constipation. No melena or hematochezia.  GENITOURINARY: No dysuria, frequency or hematuria  NEUROLOGICAL: No numbness or weakness  SKIN: No itching or rash  All other review of systems is negative unless indicated above  VITAL SIGNS:   Vital Signs Last 24 Hrs  T(C): 36.6 (29 Aug 2019 09:36), Max: 36.6 (29 Aug 2019 09:36)  T(F): 97.8 (29 Aug 2019 09:36), Max: 97.8 (29 Aug 2019 09:36)  HR: 84 (29 Aug 2019 10:00) (84 - 88)  BP: 168/78 (29 Aug 2019 10:00) (168/78 - 182/60)  BP(mean): --  RR: 14 (29 Aug 2019 10:00) (13 - 14)  SpO2: 97% (29 Aug 2019 10:00) (97% - 98%)  I&O's Summary    On Exam:  Constitutional: NAD, alert and oriented x 3  Lungs:  Non-labored, breath sounds are clear bilaterally, No wheezing, rales or rhonchi  Cardiovascular: RRR.  S1 and S2 positive.  No murmurs, rubs, gallops or clicks  Gastrointestinal: Bowel Sounds present, soft, nontender.   Lymph: No peripheral edema. No cervical lymphadenopathy.  Neurological: Alert, no focal deficits  Skin: No rashes or ulcers   Psych:  Mood & affect appropriate.    LABS: All Labs Reviewed:                        9.2    8.13  )-----------( 366      ( 29 Aug 2019 10:30 )             30.7     29 Aug 2019 10:30    145    |  116    |  51     ----------------------------<  155    5.0     |  18     |  3.70     Ca    8.8        29 Aug 2019 10:30    TPro  7.4    /  Alb  3.0    /  TBili  0.4    /  DBili  x      /  AST  38     /  ALT  39     /  AlkPhos  117    29 Aug 2019 10:30    PT/INR - ( 29 Aug 2019 10:30 )   PT: 12.3 sec;   INR: 1.08 ratio       PTT - ( 29 Aug 2019 10:30 )  PTT:38.2 sec  CARDIAC MARKERS ( 29 Aug 2019 10:30 )  .084 ng/mL / x     / x     / x     / x        Blood Culture:   08-29 @ 10:30  Pro Bnp [id number]    RADIOLOGY:  < from: Xray Chest 1 View- PORTABLE-Urgent (08.29.19 @ 10:18) >    EXAM:  XR CHEST PORTABLE URGENT 1V                          PROCEDURE DATE:  08/29/2019      INTERPRETATION:  CLINICAL INFORMATION: Shortness of breath.    TECHNIQUE: AP portable view of the chest    COMPARISON: 1/5/2012    FINDINGS:    The cardiac silhouette is enlarged. The aorta is tortuous and   atherosclerotic. There are small bilateral pleural effusions and   bibasilar atelectasis, left greater than right. There is no pneumothorax.     IMPRESSION:     Small bilateral pleural effusions and bibasilar atelectasis.     ANA PAULA KELLER M.D., ATTENDING RADIOLOGIST  This document has been electronically signed. Aug 29 2019 10:21AM      < end of copied text >    EKG:  < from: 12 Lead ECG (08.29.19 @ 09:59) >    Ventricular Rate 88 BPM    Atrial Rate 88 BPM    P-R Interval 180 ms    QRS Duration 94 ms    Q-T Interval 386 ms    QTC Calculation(Bezet) 467 ms    P Axis 33 degrees    R Axis 6 degrees    T Axis 78 degrees    Diagnosis Line Sinus rhythm with occasional premature ventricular complexes and premature atrial complexes  Possible Left atrial enlargement  Left ventricular hypertrophy with repolarization abnormality    Confirmed by NIKOLAS ROPER ([id number]) on 8/29/2019 11:27:35 AM    < end of copied text > [facility] - Flaca Summers, Missael Lombardo, Adan Roper Savella, [place]  Office Number: [phone]    Initial Consult Note: 78 y/o female presented with worsening SOB and MO x 2 days, found to have B/L pleural effusion and symptoms of volume overload    CHIEF COMPLAINT: Patient is a 79y old  Female who presents with a chief complaint of     HPI: This is a 78 y/o female current smoker with Hx of non-Hodgkin's lymphoma (last chemo in 2009), right kidney Ca s/p nephrectomy (2009), CAD s/p stents to the RCA and LAD x 2 (20019), HTN, DM2, HLD, and CKD stage 4 (s/p left AVG  not has been used) presented to the ED c/o worsening SOb, MO, and orthopnea.  denies CP or palpitations.  Denies nausea, vomiting, or diarrhea.  Denies fever or cough.  Admits to have just arrived less that a week ago from [place] and that her SOB and MO started while she was there, though, not as bad.      She has been following a cardiologist, Dr. Fiona Soler who scheduled her for a Nuclear stress Test today as preop w/u for her colonoscopy.  claimed to have had a +FOBT outpatient and was scheduled for colonoscopy.  Denies any overt GIB bleed.  She follows a nephrologist, Dr. Acosta who thinks she does need HD at this point.  She does have a mature left AV graft (placed 12/2018).  She had a creatinine level of 4.9 in 7/2019 and 3.13 in 8.2019.    she had a remote TTE in 2009 showing normal LVF, EF 75 and insignificant valvular diseases.    In the ED, her CxR showed B/L pleural effusion and imaging is evident of vascular congestion.  Her pro-BNP=>43723.  Creatinine=3.7.  she presented with elevated XMH=703.  she was given Lasix 40 gm IV x 1 and claimed to have diuresed from it.  However, clinical exam still showed significant fluid overload.    PAST MEDICAL & SURGICAL HISTORY:  Non-Hodgkins lymphoma  High cholesterol  DM (diabetes mellitus)  HTN (hypertension)  Renal failure    SOCIAL HISTORY:  No tobacco, ethanol, or drug abuse.  FAMILY HISTORY:    No family history of acute MI or sudden cardiac death.  MEDICATIONS  (STANDING):    MEDICATIONS  (PRN):    Allergies    No Known Allergies    Intolerances    REVIEW OF SYSTEMS:  CONSTITUTIONAL: No weakness, fevers or chills  EYES/ENT: No visual changes;  No vertigo or throat pain   NECK: No pain or stiffness  RESPIRATORY: No cough, wheezing, hemoptysis; + shortness of breath, MO  CARDIOVASCULAR: No chest pain or palpitations. + edema  GASTROINTESTINAL: No abdominal pain. No nausea, vomiting, or hematemesis; No diarrhea or constipation. No melena or hematochezia.  GENITOURINARY: No dysuria, frequency or hematuria  NEUROLOGICAL: No numbness or weakness  SKIN: No itching or rash  All other review of systems is negative unless indicated above  VITAL SIGNS:   Vital Signs Last 24 Hrs  T(C): 36.6 (29 Aug 2019 09:36), Max: 36.6 (29 Aug 2019 09:36)  T(F): 97.8 (29 Aug 2019 09:36), Max: 97.8 (29 Aug 2019 09:36)  HR: 84 (29 Aug 2019 10:00) (84 - 88)  BP: 168/78 (29 Aug 2019 10:00) (168/78 - 182/60)  BP(mean): --  RR: 14 (29 Aug 2019 10:00) (13 - 14)  SpO2: 97% (29 Aug 2019 10:00) (97% - 98%)  I&O's Summary    On Exam:  Constitutional: NAD, alert and oriented x 3  Lungs:  Labored, breath sounds are diminished bilaterally, No wheezing or rhonchi. + rales at bases  Cardiovascular: RRR.  S1 and S2 positive.  + murmurs.  No rubs, gallops or clicks  Gastrointestinal: Bowel Sounds present, soft, nontender.   Lymph: 2+ BLE edema. No cervical lymphadenopathy.  Neurological: Alert, no focal deficits  Skin: No rashes or ulcers.  Left AVG  Psych:  Mood & affect appropriate.    LABS: All Labs Reviewed:                        9.2    8.13  )-----------( 366      ( 29 Aug 2019 10:30 )             30.7     29 Aug 2019 10:30    145    |  116    |  51     ----------------------------<  155    5.0     |  18     |  3.70     Ca    8.8        29 Aug 2019 10:30    TPro  7.4    /  Alb  3.0    /  TBili  0.4    /  DBili  x      /  AST  38     /  ALT  39     /  AlkPhos  117    29 Aug 2019 10:30    PT/INR - ( 29 Aug 2019 10:30 )   PT: 12.3 sec;   INR: 1.08 ratio       PTT - ( 29 Aug 2019 10:30 )  PTT:38.2 sec  CARDIAC MARKERS ( 29 Aug 2019 10:30 )  .084 ng/mL / x     / x     / x     / x        Blood Culture:   08-29 @ 10:30  Pro Bnp [id number]    RADIOLOGY:  < from: Xray Chest 1 View- PORTABLE-Urgent (08.29.19 @ 10:18) >    EXAM:  XR CHEST PORTABLE URGENT 1V                          PROCEDURE DATE:  08/29/2019      INTERPRETATION:  CLINICAL INFORMATION: Shortness of breath.    TECHNIQUE: AP portable view of the chest    COMPARISON: 1/5/2012    FINDINGS:    The cardiac silhouette is enlarged. The aorta is tortuous and   atherosclerotic. There are small bilateral pleural effusions and   bibasilar atelectasis, left greater than right. There is no pneumothorax.     IMPRESSION:     Small bilateral pleural effusions and bibasilar atelectasis.     ANA PAULA KELLER M.D., ATTENDING RADIOLOGIST  This document has been electronically signed. Aug 29 2019 10:21AM      < end of copied text >    EKG:  < from: 12 Lead ECG (08.29.19 @ 09:59) >    Ventricular Rate 88 BPM    Atrial Rate 88 BPM    P-R Interval 180 ms    QRS Duration 94 ms    Q-T Interval 386 ms    QTC Calculation(Bezet) 467 ms    P Axis 33 degrees    R Axis 6 degrees    T Axis 78 degrees    Diagnosis Line Sinus rhythm with occasional premature ventricular complexes and premature atrial complexes  Possible Left atrial enlargement  Left ventricular hypertrophy with repolarization abnormality    Confirmed by NIKOLAS ROPER ([id number]) on 8/29/2019 11:27:35 AM    < end of copied text > [facility] - Flaca Summers, Missael Lombardo, Adan Roper Savella, [place]  Office Number: [phone]    Initial Consult Note: 80 y/o female presented with worsening SOB and MO x 2 days, found to have B/L pleural effusion and symptoms of volume overload    CHIEF COMPLAINT: Patient is a 79y old  Female who presents with a chief complaint of     HPI: This is a 80 y/o female current smoker with Hx of non-Hodgkin's lymphoma (last chemo in 2009), right kidney Ca s/p nephrectomy (2009), CAD s/p stents to the RCA and LAD x 2 (2009), HTN, DM2, HLD, and CKD stage 4 (s/p left AVG not has been used) presented to the ED c/o worsening SOB, MO, and orthopnea.  denies CP or palpitations.  Denies nausea, vomiting, or diarrhea.  Denies fever or cough.  Admits to have just arrived less that a week ago from [place] and that her SOB and MO started while she was there, though, not as bad.      She has been following a cardiologist, Dr. Fiona Soler, who scheduled her for a Nuclear stress Test today as preop w/u for her colonoscopy.  Claimed to have had a +FOBT outpatient and was scheduled for colonoscopy.  Denies any overt GIB bleed.  She follows a nephrologist, Dr. Acosta who thinks she does need HD at this point.  She does have a mature left AV graft (placed 12/2018).  She had a creatinine level of 4.9 in 7/2019 and 3.13 in 8.2019.    She had a remote TTE in 2009 showing normal LVF, EF 75 and insignificant valvular diseases.    In the ED, her CxR showed B/L pleural effusion and imaging is evident of vascular congestion.  Her pro-BNP=>64922.  Creatinine=3.7.  she presented with elevated BCK=070.  she was given Lasix 40 gm IV x 1 and claimed to have diuresed from it.  However, clinical exam still showed significant fluid overload.    PAST MEDICAL & SURGICAL HISTORY:  Non-Hodgkins lymphoma  High cholesterol  DM (diabetes mellitus)  HTN (hypertension)  Renal failure    SOCIAL HISTORY:  No tobacco, ethanol, or drug abuse.  FAMILY HISTORY:    No family history of acute MI or sudden cardiac death.  MEDICATIONS  (STANDING):    MEDICATIONS  (PRN):    Allergies    No Known Allergies    Intolerances    REVIEW OF SYSTEMS:  CONSTITUTIONAL: No weakness, fevers or chills  EYES/ENT: No visual changes;  No vertigo or throat pain   NECK: No pain or stiffness  RESPIRATORY: No cough, wheezing, hemoptysis; + shortness of breath, MO  CARDIOVASCULAR: No chest pain or palpitations. + edema  GASTROINTESTINAL: No abdominal pain. No nausea, vomiting, or hematemesis; No diarrhea or constipation. No melena or hematochezia.  GENITOURINARY: No dysuria, frequency or hematuria  NEUROLOGICAL: No numbness or weakness  SKIN: No itching or rash  All other review of systems is negative unless indicated above  VITAL SIGNS:   Vital Signs Last 24 Hrs  T(C): 36.6 (29 Aug 2019 09:36), Max: 36.6 (29 Aug 2019 09:36)  T(F): 97.8 (29 Aug 2019 09:36), Max: 97.8 (29 Aug 2019 09:36)  HR: 84 (29 Aug 2019 10:00) (84 - 88)  BP: 168/78 (29 Aug 2019 10:00) (168/78 - 182/60)  BP(mean): --  RR: 14 (29 Aug 2019 10:00) (13 - 14)  SpO2: 97% (29 Aug 2019 10:00) (97% - 98%)  I&O's Summary    On Exam:  Constitutional: NAD, alert and oriented x 3  Lungs:  Labored, breath sounds are diminished bilaterally, No wheezing or rhonchi. + rales at bases  Cardiovascular: RRR.  S1 and S2 positive.  + murmurs.  No rubs, gallops or clicks  Gastrointestinal: Bowel Sounds present, soft, nontender.   Lymph: 2+ BLE edema. No cervical lymphadenopathy.  Neurological: Alert, no focal deficits  Skin: No rashes or ulcers.  Left AVG  Psych:  Mood & affect appropriate.    LABS: All Labs Reviewed:                        9.2    8.13  )-----------( 366      ( 29 Aug 2019 10:30 )             30.7     29 Aug 2019 10:30    145    |  116    |  51     ----------------------------<  155    5.0     |  18     |  3.70     Ca    8.8        29 Aug 2019 10:30    TPro  7.4    /  Alb  3.0    /  TBili  0.4    /  DBili  x      /  AST  38     /  ALT  39     /  AlkPhos  117    29 Aug 2019 10:30    PT/INR - ( 29 Aug 2019 10:30 )   PT: 12.3 sec;   INR: 1.08 ratio       PTT - ( 29 Aug 2019 10:30 )  PTT:38.2 sec  CARDIAC MARKERS ( 29 Aug 2019 10:30 )  .084 ng/mL / x     / x     / x     / x        Blood Culture:   08-29 @ 10:30  Pro Bnp [id number]    RADIOLOGY:  < from: Xray Chest 1 View- PORTABLE-Urgent (08.29.19 @ 10:18) >    EXAM:  XR CHEST PORTABLE URGENT 1V                          PROCEDURE DATE:  08/29/2019      INTERPRETATION:  CLINICAL INFORMATION: Shortness of breath.    TECHNIQUE: AP portable view of the chest    COMPARISON: 1/5/2012    FINDINGS:    The cardiac silhouette is enlarged. The aorta is tortuous and   atherosclerotic. There are small bilateral pleural effusions and   bibasilar atelectasis, left greater than right. There is no pneumothorax.     IMPRESSION:     Small bilateral pleural effusions and bibasilar atelectasis.     ANA PAULA KELLER M.D., ATTENDING RADIOLOGIST  This document has been electronically signed. Aug 29 2019 10:21AM      < end of copied text >    EKG:  < from: 12 Lead ECG (08.29.19 @ 09:59) >    Ventricular Rate 88 BPM    Atrial Rate 88 BPM    P-R Interval 180 ms    QRS Duration 94 ms    Q-T Interval 386 ms    QTC Calculation(Bezet) 467 ms    P Axis 33 degrees    R Axis 6 degrees    T Axis 78 degrees    Diagnosis Line Sinus rhythm with occasional premature ventricular complexes and premature atrial complexes  Possible Left atrial enlargement  Left ventricular hypertrophy with repolarization abnormality    Confirmed by NIKOLAS ROPER ([id number]) on 8/29/2019 11:27:35 AM    < end of copied text > [facility] - Flaca Summers, Missael Lombardo, Adan Roper Savella, Goodger  Office Number: [phone]    Initial Consult Note: 80 y/o female presented with worsening SOB and MO x 2 days, found to have B/L pleural effusion and symptoms of volume overload    CHIEF COMPLAINT: Patient is a 79y old  Female who presents with a chief complaint of     HPI: This is a 80 y/o female current smoker with Hx of non-Hodgkin's lymphoma (last chemo in 2009), right kidney Ca s/p nephrectomy (2009), CAD s/p stents to the RCA and LAD x 2 (2009), HTN, DM2, HLD, and CKD stage 4 (s/p left AVG not has been used) presented to the ED c/o worsening SOB, MO, and orthopnea.  denies CP or palpitations.  Denies nausea, vomiting, or diarrhea.  Denies fever or cough.  Admits to have just arrived less that a week ago from [place] and that her SOB and MO started while she was there, though, not as bad.      She has been following a cardiologist, Dr. Fiona Soler, who scheduled her for a Nuclear stress Test today as preop w/u for her colonoscopy.  Claimed to have had a +FOBT outpatient and was scheduled for colonoscopy.  Denies any overt GIB bleed.  She follows a nephrologist, Dr. Acosta who thinks she does not need HD at this point.  She does have a mature left AV graft (placed 12/2018).  She had a creatinine level of 4.9 in 7/2019 and 3.13 in 8.2019.    She had a remote TTE in 2009 showing normal LVF, EF 75 and insignificant valvular diseases.    In the ED, her CxR showed B/L pleural effusion and imaging is evident of vascular congestion.  Her pro-BNP=>03335.  Creatinine=3.7.  she presented with elevated IDK=673.  she was given Lasix 40 gm IV x 1 and claimed to have diuresed from it.  However, clinical exam still showed significant fluid overload.    PAST MEDICAL & SURGICAL HISTORY:  Non-Hodgkins lymphoma  High cholesterol  DM (diabetes mellitus)  HTN (hypertension)  Renal failure    SOCIAL HISTORY:  No tobacco, ethanol, or drug abuse.  FAMILY HISTORY:    No family history of acute MI or sudden cardiac death.  MEDICATIONS  (STANDING):    MEDICATIONS  (PRN):    Allergies    No Known Allergies    Intolerances    REVIEW OF SYSTEMS:  CONSTITUTIONAL: No weakness, fevers or chills  EYES/ENT: No visual changes;  No vertigo or throat pain   NECK: No pain or stiffness  RESPIRATORY: No cough, wheezing, hemoptysis; + shortness of breath, MO  CARDIOVASCULAR: No chest pain or palpitations. + edema  GASTROINTESTINAL: No abdominal pain. No nausea, vomiting, or hematemesis; No diarrhea or constipation. No melena or hematochezia.  GENITOURINARY: No dysuria, frequency or hematuria  NEUROLOGICAL: No numbness or weakness  SKIN: No itching or rash  All other review of systems is negative unless indicated above  VITAL SIGNS:   Vital Signs Last 24 Hrs  T(C): 36.6 (29 Aug 2019 09:36), Max: 36.6 (29 Aug 2019 09:36)  T(F): 97.8 (29 Aug 2019 09:36), Max: 97.8 (29 Aug 2019 09:36)  HR: 84 (29 Aug 2019 10:00) (84 - 88)  BP: 168/78 (29 Aug 2019 10:00) (168/78 - 182/60)  BP(mean): --  RR: 14 (29 Aug 2019 10:00) (13 - 14)  SpO2: 97% (29 Aug 2019 10:00) (97% - 98%)  I&O's Summary    On Exam:  Constitutional: NAD, alert and oriented x 3  Lungs:  Labored, breath sounds are diminished bilaterally, No wheezing or rhonchi. + rales at bases  Cardiovascular: RRR.  S1 and S2 positive.  + murmurs.  No rubs, gallops or clicks  Gastrointestinal: Bowel Sounds present, soft, nontender.   Lymph: 2+ BLE edema. No cervical lymphadenopathy.  Neurological: Alert, no focal deficits  Skin: No rashes or ulcers.  Left AVG  Psych:  Mood & affect appropriate.    LABS: All Labs Reviewed:                        9.2    8.13  )-----------( 366      ( 29 Aug 2019 10:30 )             30.7     29 Aug 2019 10:30    145    |  116    |  51     ----------------------------<  155    5.0     |  18     |  3.70     Ca    8.8        29 Aug 2019 10:30    TPro  7.4    /  Alb  3.0    /  TBili  0.4    /  DBili  x      /  AST  38     /  ALT  39     /  AlkPhos  117    29 Aug 2019 10:30    PT/INR - ( 29 Aug 2019 10:30 )   PT: 12.3 sec;   INR: 1.08 ratio       PTT - ( 29 Aug 2019 10:30 )  PTT:38.2 sec  CARDIAC MARKERS ( 29 Aug 2019 10:30 )  .084 ng/mL / x     / x     / x     / x        Blood Culture:   08-29 @ 10:30  Pro Bnp [id number]    RADIOLOGY:  < from: Xray Chest 1 View- PORTABLE-Urgent (08.29.19 @ 10:18) >    EXAM:  XR CHEST PORTABLE URGENT 1V                          PROCEDURE DATE:  08/29/2019      INTERPRETATION:  CLINICAL INFORMATION: Shortness of breath.    TECHNIQUE: AP portable view of the chest    COMPARISON: 1/5/2012    FINDINGS:    The cardiac silhouette is enlarged. The aorta is tortuous and   atherosclerotic. There are small bilateral pleural effusions and   bibasilar atelectasis, left greater than right. There is no pneumothorax.     IMPRESSION:     Small bilateral pleural effusions and bibasilar atelectasis.     ANA PAULA KELLER M.D., ATTENDING RADIOLOGIST  This document has been electronically signed. Aug 29 2019 10:21AM      < end of copied text >    EKG:  < from: 12 Lead ECG (08.29.19 @ 09:59) >    Ventricular Rate 88 BPM    Atrial Rate 88 BPM    P-R Interval 180 ms    QRS Duration 94 ms    Q-T Interval 386 ms    QTC Calculation(Bezet) 467 ms    P Axis 33 degrees    R Axis 6 degrees    T Axis 78 degrees    Diagnosis Line Sinus rhythm with occasional premature ventricular complexes and premature atrial complexes  Possible Left atrial enlargement  Left ventricular hypertrophy with repolarization abnormality    Confirmed by NIKOLAS ROPER ([id number]) on 8/29/2019 11:27:35 AM    < end of copied text >

## 2019-08-29 NOTE — H&P ADULT - ASSESSMENT
Pt is a 80 yo F w/ PMH Non-Hodgkins lymphoma, High cholesterol, DM (on insulin),HTN, chronic Renal failure, angioplasty 2009, removal of R kidney and gall bladder 2011, chemo 2011 that presents to [facility] ED c/o SOB for the past 3-4 days being admitted for SOB likely 2/2 to CHF exacerbation. Pt is a 78 yo F w/ PMH Non-Hodgkins lymphoma, High cholesterol, DM (on insulin),HTN, chronic Renal failure, angioplasty 2009, removal of R kidney and gall bladder 2011, chemo 2011 that presents to [facility] ED c/o SOB for the past 3-4 days being admitted for SOB likely 2/2 to acute CHF exacerbation. Pt is a 80 yo F w/ PMH Non-Hodgkins lymphoma, High cholesterol, DM (on insulin),HTN, chronic Renal failure, angioplasty 2009, removal of R kidney and gall bladder 2011, chemo 2011 that presents to [facility] ED c/o SOB being admitted for SOB likely 2/2 to acute CHF exacerbation.

## 2019-08-29 NOTE — CONSULT NOTE ADULT - ATTENDING COMMENTS
I personally saw and examined the patient in detail.  I have spoken to the above provider regarding the assessment and plan of care.  I reviewed the above assessment and plan of care, and agree.  I have made changes in the body of the note where appropriate.  Markedly volume overloaded.  To try IV Lasix as an initial management strategy.

## 2019-08-29 NOTE — ED ADULT NURSE NOTE - NSIMPLEMENTINTERV_GEN_ALL_ED
Implemented All Fall Risk Interventions:  [place] to call system. Call bell, personal items and telephone within reach. Instruct patient to call for assistance. Room bathroom lighting operational. Non-slip footwear when patient is off stretcher. Physically safe environment: no spills, clutter or unnecessary equipment. Stretcher in lowest position, wheels locked, appropriate side rails in place. Provide visual cue, wrist band, yellow gown, etc. Monitor gait and stability. Monitor for mental status changes and reorient to person, place, and time. Review medications for side effects contributing to fall risk. Reinforce activity limits and safety measures with patient and family.

## 2019-08-29 NOTE — H&P ADULT - PROBLEM SELECTOR PLAN 5
-dash diet  -continue home dose atorvastain -dash diet  - f/u lipid panel   -continue home dose atorvastatin

## 2019-08-29 NOTE — H&P ADULT - ATTENDING COMMENTS
Patient seen and examined at bedside. Plan as above d/w residents.    Continue Lasix  TTE  Stool for OB  Monitor H/H  Protonix 40mg po daily  Monitor renal function   Cardio, Nephro consults appreciated  D/w patient, all questions answered.

## 2019-08-29 NOTE — H&P ADULT - NSTOBACCOMED_CS_GEN_A_CORE_ALL
Post-Care Instructions: I reviewed with the patient in detail post-care instructions. Patient is to wear sunprotection, and avoid picking at any of the treated lesions. Pt may apply Vaseline to crusted or scabbing areas. Render Post-Care Instructions In Note?: no Duration Of Freeze Thaw-Cycle (Seconds): 0 Detail Level: Detailed Consent: The patient's consent was obtained including but not limited to risks of crusting, scabbing, blistering, scarring, darker or lighter pigmentary change, recurrence, incomplete removal and infection. Offered and patient declined

## 2019-08-29 NOTE — H&P ADULT - PROBLEM SELECTOR PLAN 4
-Consistent carb diet, renal and dash.   -hypoglyemic protocol in place  -sliding scale insulin -Consistent carb diet, renal and dash.   -hypoglycemic protocol in place  -sliding scale insulin -Consistent carb diet, renal and dash.   -hypoglycemic protocol in place  -sliding scale insulin  - Will check HbA1C

## 2019-08-29 NOTE — H&P ADULT - NSHPREVIEWOFSYSTEMS_GEN_ALL_CORE
CONSTITUTIONAL: denies fever, chills, fatigue, weakness  HEENT: denies blurred vision, sore throat  SKIN: denies new lesions, rash  CARDIOVASCULAR: denies chest pain, chest pressure, palpitations  RESPIRATORY: denies shortness of breath, sputum production  GASTROINTESTINAL: denies nausea, vomiting, diarrhea, abdominal pain  GENITOURINARY: denies dysuria, discharge  NEUROLOGICAL: denies numbness, headache, focal weakness  MUSCULOSKELETAL: denies new joint pain, muscle aches  HEMATOLOGIC: denies gross bleeding, bruising  LYMPHATICS: denies enlarged lymph nodes, extremity swelling  PSYCHIATRIC: denies recent changes in anxiety, depression  ENDOCRINOLOGIC: denies sweating, cold or heat intolerance CONSTITUTIONAL: denies fever, chills, fatigue, weakness  HEENT: denies blurred vision, sore throat  CARDIOVASCULAR: denies chest pain, chest pressure. admits to palpitations  RESPIRATORY: admits to shortness of breath, denies sputum production  GASTROINTESTINAL: denies nausea, vomiting, diarrhea, abdominal pain  NEUROLOGICAL: denies numbness, headache, focal weakness  MUSCULOSKELETAL: admits to LE pain  HEMATOLOGIC: denies gross bleeding, bruising  PSYCHIATRIC: denies recent changes in anxiety, depression

## 2019-08-29 NOTE — H&P ADULT - PROBLEM SELECTOR PLAN 3
-H/H: 9.2/30.7  -Monitor CBC  -FOBT positive, was scheduled for outpatient colonoscopy, did not yet receive test.  -Consult GI -H/H: 9.2/30.7  -Monitor CBC AM  -FOBT positive, was scheduled for outpatient colonoscopy, did not yet receive test.  -Consult GI, consider inpatient colonoscopy once CHF exacerbation managed and patient optimized. -H/H: 9.2/30.7  -Monitor CBC AM  -FOBT positive, was scheduled for outpatient colonoscopy, did not yet receive test.  -Consult GI, Dr. Garcia, consider inpatient colonoscopy once CHF exacerbation managed and patient optimized. -H/H: 9.2/30.7  -Monitor CBC AM  -FOBT positive as out patient, was scheduled for outpatient colonoscopy, did not yet receive test.  -Will check stool for occult blood   -Consult GI, Dr. Garcia, consider inpatient colonoscopy once CHF exacerbation managed and patient optimized.

## 2019-08-29 NOTE — ED ADULT NURSE NOTE - PMH
DM (diabetes mellitus)    High cholesterol    HTN (hypertension)    Non-Hodgkins lymphoma    Renal failure

## 2019-08-29 NOTE — CONSULT NOTE ADULT - SUBJECTIVE AND OBJECTIVE BOX
Patient is a 79y old  Female who presents with a chief complaint of SOB.     HPI:  79 F with CRF, SP nephrectomy, has AV graft in left arm which hasn't been used yet, co SOB for 3 days. Denies fever, cough, CP, N, V, or other symptom.   PCP Hallie Acosta    Renal consult called for chronic kidney disease stage 4. Pt known to our practice. Seen by Dr. Acosta.       PAST MEDICAL HISTORY:  Non-Hodgkins lymphoma  High cholesterol  DM (diabetes mellitus)  HTN (hypertension)  Renal failure      PAST SURGICAL HISTORY:  AVG (in [facility])      FAMILY HISTORY:      SOCIAL HISTORY: + smoking, no alcohol use.    Allergies    No Known Allergies    Intolerances      Home Medications:  aspirin 81 mg oral tablet: 1 tab(s) orally once a day (29 Aug 2019 09:48)  atorvastatin 80 mg oral tablet: 1 tab(s) orally once a day (29 Aug 2019 09:48)  clopidogrel:  (29 Aug 2019 09:48)  furosemide 20 mg oral tablet: 1 tab(s) orally once a day (29 Aug 2019 09:48)  hydrALAZINE:  (29 Aug 2019 09:48)    MEDICATIONS  (STANDING):    MEDICATIONS  (PRN):      REVIEW OF SYSTEMS:  General: sob  Respiratory: No cough, + SOB  Cardiovascular: No CP or Palpitations	  Gastrointestinal: No nausea, Vomiting. No diarrhea  Genitourinary: No urinary complaints	  Musculoskeletal: + leg swelling, No new rash or lesions	  all other systems negative    T(F): 97.8 (08-29-19 @ 09:36), Max: 97.8 (08-29-19 @ 09:36)  HR: 84 (08-29-19 @ 10:00) (84 - 88)  BP: 168/78 (08-29-19 @ 10:00) (168/78 - 182/60)  RR: 14 (08-29-19 @ 10:00) (13 - 14)  SpO2: 97% (08-29-19 @ 10:00) (97% - 98%)  Wt(kg): --    PHYSICAL EXAM:  General: NAD  Respiratory: b/l air entry  Cardiovascular: S1 S2  Gastrointestinal: soft  Extremities: + edema, Left AVG, + bruit        08-29    145  |  116<H>  |  51<H>  ----------------------------<  155<H>  5.0   |  18<L>  |  3.70<H>    Ca    8.8      29 Aug 2019 10:30    TPro  7.4  /  Alb  3.0<L>  /  TBili  0.4  /  DBili  x   /  AST  38<H>  /  ALT  39  /  AlkPhos  117  08-29                          9.2    8.13  )-----------( 366      ( 29 Aug 2019 10:30 )             30.7       Hemoglobin: 9.2 g/dL (08-29 @ 10:30)  Hematocrit: 30.7 % (08-29 @ 10:30)  Potassium, Serum: 5.0 mmol/L (08-29 @ 10:30)  Blood Urea Nitrogen, Serum: 51 mg/dL (08-29 @ 10:30)      Creatinine, Serum: 3.70 (08-29 @ 10:30)        LIVER FUNCTIONS - ( 29 Aug 2019 10:30 )  Alb: 3.0 g/dL / Pro: 7.4 g/dL / ALK PHOS: 117 U/L / ALT: 39 U/L / AST: 38 U/L / GGT: x           CARDIAC MARKERS ( 29 Aug 2019 10:30 )  .084 ng/mL / x     / x     / x     / x            I&O's Detail    < from: Xray Chest 1 View- PORTABLE-Urgent (08.29.19 @ 10:18) >    EXAM:  XR CHEST PORTABLE URGENT 1V                            PROCEDURE DATE:  08/29/2019          INTERPRETATION:  CLINICAL INFORMATION: Shortness of breath.    TECHNIQUE: AP portable view of the chest    COMPARISON: 1/5/2012    FINDINGS:    The cardiac silhouette is enlarged. The aorta is tortuous and   atherosclerotic. There are small bilateral pleural effusions and   bibasilar atelectasis, left greater than right. There is no pneumothorax.     IMPRESSION:     Small bilateral pleural effusions and bibasilar atelectasis.                 ANA PAULA KELLER M.D., ATTENDING RADIOLOGIST  This document has been electronically signed. Aug 29 2019 10:21AM                < end of copied text >

## 2019-08-29 NOTE — H&P ADULT - NSHPPHYSICALEXAM_GEN_ALL_CORE
T(C): 36.7 (08-29-19 @ 14:54), Max: 36.7 (08-29-19 @ 14:54)  HR: 78 (08-29-19 @ 14:54) (78 - 88)  BP: 170/76 (08-29-19 @ 14:54) (168/78 - 182/60)  RR: 16 (08-29-19 @ 14:54) (13 - 16)  SpO2: 97% (08-29-19 @ 14:54) (97% - 98%)    GENERAL: patient appears well, no acute distress, appropriate, pleasant  EYES: sclera clear, no exudates  ENMT: oropharynx clear without erythema, no exudates, moist mucous membranes  NECK: supple, soft, no thyromegaly noted  LUNGS: good air entry bilaterally, clear to auscultation, symmetric breath sounds, no wheezing or rhonchi appreciated  HEART: soft S1/S2, regular rate and rhythm, no murmurs noted, no lower extremity edema  GASTROINTESTINAL: abdomen is soft, nontender, nondistended, normoactive bowel sounds, no palpable masses  INTEGUMENT: good skin turgor, no lesions noted  MUSCULOSKELETAL: no clubbing or cyanosis, no obvious deformity  NEUROLOGIC: awake, alert, oriented x3, good muscle tone in 4 extremities, no obvious sensory deficits  PSYCHIATRIC: mood is good, affect is congruent, linear and logical thought process  HEME/LYMPH: no palpable supraclavicular nodules, no obvious ecchymosis or petechiae T(C): 36.7 (08-29-19 @ 14:54), Max: 36.7 (08-29-19 @ 14:54)  HR: 78 (08-29-19 @ 14:54) (78 - 88)  BP: 170/76 (08-29-19 @ 14:54) (168/78 - 182/60)  RR: 16 (08-29-19 @ 14:54) (13 - 16)  SpO2: 97% (08-29-19 @ 14:54) (97% - 98%)    GENERAL: patient appears well, no acute distress, appropriate, pleasant  EYES: sclera clear, no exudates  ENMT: oropharynx clear without erythema, no exudates, moist mucous membranes  NECK: supple, soft  LUNGS: good air entry bilaterally, symmetric breath sounds, crackles auscultated in L posterior upper lobe.   HEART: soft S1/S2, regular rate and rhythm, no murmurs noted  GASTROINTESTINAL: abdomen is soft, nontender, nondistended, normoactive bowel sounds, no palpable masses  MUSCULOSKELETAL: no clubbing or cyanosis, no obvious deformity  NEUROLOGIC: awake, alert, oriented x3, good muscle tone in 4 extremities, no obvious sensory deficits  PSYCHIATRIC: mood is good, affect is congruent, linear and logical thought process  EXT: B/L LE nontender to palpation. B/L LE 2+ pitting edema extending to knees most concentrated at B/L ankles.

## 2019-08-30 DIAGNOSIS — J90 PLEURAL EFFUSION, NOT ELSEWHERE CLASSIFIED: ICD-10-CM

## 2019-08-30 DIAGNOSIS — R06.02 SHORTNESS OF BREATH: ICD-10-CM

## 2019-08-30 LAB
ALBUMIN SERPL ELPH-MCNC: 2.4 G/DL — LOW (ref 3.3–5)
ALP SERPL-CCNC: 91 U/L — SIGNIFICANT CHANGE UP (ref 40–120)
ALT FLD-CCNC: 29 U/L — SIGNIFICANT CHANGE UP (ref 12–78)
ANION GAP SERPL CALC-SCNC: 10 MMOL/L — SIGNIFICANT CHANGE UP (ref 5–17)
AST SERPL-CCNC: 27 U/L — SIGNIFICANT CHANGE UP (ref 15–37)
BILIRUB SERPL-MCNC: 0.3 MG/DL — SIGNIFICANT CHANGE UP (ref 0.2–1.2)
BUN SERPL-MCNC: 59 MG/DL — HIGH (ref 7–23)
CALCIUM SERPL-MCNC: 8.4 MG/DL — LOW (ref 8.5–10.1)
CALCIUM SERPL-MCNC: 8.8 MG/DL — SIGNIFICANT CHANGE UP (ref 8.4–10.5)
CHLORIDE SERPL-SCNC: 116 MMOL/L — HIGH (ref 96–108)
CHOLEST SERPL-MCNC: 118 MG/DL — SIGNIFICANT CHANGE UP (ref 10–199)
CO2 SERPL-SCNC: 19 MMOL/L — LOW (ref 22–31)
CREAT SERPL-MCNC: 4 MG/DL — HIGH (ref 0.5–1.3)
CULTURE RESULTS: SIGNIFICANT CHANGE UP
FERRITIN SERPL-MCNC: 485 NG/ML — HIGH (ref 15–150)
GLUCOSE SERPL-MCNC: 113 MG/DL — HIGH (ref 70–99)
HBA1C BLD-MCNC: 6.6 % — HIGH (ref 4–5.6)
HCT VFR BLD CALC: 25.9 % — LOW (ref 34.5–45)
HCT VFR BLD CALC: 27.3 % — LOW (ref 34.5–45)
HDLC SERPL-MCNC: 68 MG/DL — SIGNIFICANT CHANGE UP
HGB BLD-MCNC: 7.8 G/DL — LOW (ref 11.5–15.5)
HGB BLD-MCNC: 8.4 G/DL — LOW (ref 11.5–15.5)
IRON SATN MFR SERPL: 16 % — SIGNIFICANT CHANGE UP (ref 14–50)
IRON SATN MFR SERPL: 35 UG/DL — SIGNIFICANT CHANGE UP (ref 30–160)
LIPID PNL WITH DIRECT LDL SERPL: 31 MG/DL — SIGNIFICANT CHANGE UP
MCHC RBC-ENTMCNC: 27.2 PG — SIGNIFICANT CHANGE UP (ref 27–34)
MCHC RBC-ENTMCNC: 30.1 GM/DL — LOW (ref 32–36)
MCV RBC AUTO: 90.2 FL — SIGNIFICANT CHANGE UP (ref 80–100)
NRBC # BLD: 0 /100 WBCS — SIGNIFICANT CHANGE UP (ref 0–0)
PHOSPHATE SERPL-MCNC: 5.1 MG/DL — HIGH (ref 2.5–4.5)
PLATELET # BLD AUTO: 299 K/UL — SIGNIFICANT CHANGE UP (ref 150–400)
POTASSIUM SERPL-MCNC: 4.8 MMOL/L — SIGNIFICANT CHANGE UP (ref 3.5–5.3)
POTASSIUM SERPL-SCNC: 4.8 MMOL/L — SIGNIFICANT CHANGE UP (ref 3.5–5.3)
PROT SERPL-MCNC: 6.1 G/DL — SIGNIFICANT CHANGE UP (ref 6–8.3)
PTH-INTACT FLD-MCNC: 140 PG/ML — HIGH (ref 15–65)
RBC # BLD: 2.87 M/UL — LOW (ref 3.8–5.2)
RBC # FLD: 18.1 % — HIGH (ref 10.3–14.5)
SODIUM SERPL-SCNC: 145 MMOL/L — SIGNIFICANT CHANGE UP (ref 135–145)
SPECIMEN SOURCE: SIGNIFICANT CHANGE UP
TIBC SERPL-MCNC: 216 UG/DL — LOW (ref 220–430)
TOTAL CHOLESTEROL/HDL RATIO MEASUREMENT: 1.7 RATIO — LOW (ref 3.3–7.1)
TRIGL SERPL-MCNC: 96 MG/DL — SIGNIFICANT CHANGE UP (ref 10–149)
UIBC SERPL-MCNC: 181 UG/DL — SIGNIFICANT CHANGE UP (ref 110–370)
WBC # BLD: 7.4 K/UL — SIGNIFICANT CHANGE UP (ref 3.8–10.5)
WBC # FLD AUTO: 7.4 K/UL — SIGNIFICANT CHANGE UP (ref 3.8–10.5)

## 2019-08-30 PROCEDURE — 99232 SBSQ HOSP IP/OBS MODERATE 35: CPT

## 2019-08-30 PROCEDURE — 93010 ELECTROCARDIOGRAM REPORT: CPT

## 2019-08-30 PROCEDURE — 76604 US EXAM CHEST: CPT | Mod: 26

## 2019-08-30 PROCEDURE — 99233 SBSQ HOSP IP/OBS HIGH 50: CPT

## 2019-08-30 RX ORDER — ERYTHROPOIETIN 10000 [IU]/ML
10000 INJECTION, SOLUTION INTRAVENOUS; SUBCUTANEOUS
Refills: 0 | Status: DISCONTINUED | OUTPATIENT
Start: 2019-08-30 | End: 2019-09-03

## 2019-08-30 RX ORDER — METOPROLOL TARTRATE 50 MG
25 TABLET ORAL
Refills: 0 | Status: DISCONTINUED | OUTPATIENT
Start: 2019-08-30 | End: 2019-09-03

## 2019-08-30 RX ORDER — IRON SUCROSE 20 MG/ML
100 INJECTION, SOLUTION INTRAVENOUS
Refills: 0 | Status: COMPLETED | OUTPATIENT
Start: 2019-08-30 | End: 2019-09-01

## 2019-08-30 RX ORDER — PANTOPRAZOLE SODIUM 20 MG/1
40 TABLET, DELAYED RELEASE ORAL
Refills: 0 | Status: DISCONTINUED | OUTPATIENT
Start: 2019-08-30 | End: 2019-09-03

## 2019-08-30 RX ADMIN — Medication 81 MILLIGRAM(S): at 11:30

## 2019-08-30 RX ADMIN — ATORVASTATIN CALCIUM 80 MILLIGRAM(S): 80 TABLET, FILM COATED ORAL at 21:30

## 2019-08-30 RX ADMIN — Medication 25 MILLIGRAM(S): at 11:30

## 2019-08-30 RX ADMIN — Medication 1: at 17:33

## 2019-08-30 RX ADMIN — ERYTHROPOIETIN 10000 UNIT(S): 10000 INJECTION, SOLUTION INTRAVENOUS; SUBCUTANEOUS at 17:34

## 2019-08-30 RX ADMIN — Medication 25 MILLIGRAM(S): at 17:34

## 2019-08-30 RX ADMIN — Medication 40 MILLIGRAM(S): at 17:33

## 2019-08-30 RX ADMIN — Medication 40 MILLIGRAM(S): at 06:25

## 2019-08-30 RX ADMIN — PANTOPRAZOLE SODIUM 40 MILLIGRAM(S): 20 TABLET, DELAYED RELEASE ORAL at 17:33

## 2019-08-30 RX ADMIN — IRON SUCROSE 100 MILLIGRAM(S): 20 INJECTION, SOLUTION INTRAVENOUS at 17:34

## 2019-08-30 RX ADMIN — Medication 50 MILLIGRAM(S): at 06:24

## 2019-08-30 RX ADMIN — AMLODIPINE BESYLATE 10 MILLIGRAM(S): 2.5 TABLET ORAL at 06:24

## 2019-08-30 RX ADMIN — Medication 50 MILLIGRAM(S): at 21:30

## 2019-08-30 RX ADMIN — Medication 50 MILLIGRAM(S): at 13:44

## 2019-08-30 NOTE — CONSULT NOTE ADULT - PROBLEM SELECTOR RECOMMENDATION 9
multifactorial  reports +fobt as op  no evidence of overt gib  no evidence of active gib  monitor cbc, transfuse prn  cont ppi  repeat fobt  would defer to cardiology re: clearance for endoscopic w/u when optimized as pt states she was planned for op stress test prior to op colon  will follow multifactorial  reports +fobt as op  no evidence of active gib  monitor cbc, transfuse prn  rec ppi ppx  repeat fobt  would need cardiac clearance prior to any planned endoscopic w/u when optimized  will follow

## 2019-08-30 NOTE — CHART NOTE - NSCHARTNOTEFT_GEN_A_CORE
TTE showed large B/L pleural effusion.  Discussed with primary, Dr. Koenig, who called Pulm tamikoal for possible thoracentesis.    [facility]  Cardiology  Spectra# [phone]

## 2019-08-30 NOTE — CONSULT NOTE ADULT - ASSESSMENT
80 yo F w/ PMH Non-Hodgkins lymphoma, High cholesterol, DM (on insulin),HTN, chronic Renal failure, angioplasty 2009, removal of R kidney and gall bladder 2011, chemo 2011 that presents to [facility] ED c/o SOB for the past 3-4 days. pt w + op fobt test, was planned for colonoscopy, gi consulted

## 2019-08-30 NOTE — PROGRESS NOTE ADULT - SUBJECTIVE AND OBJECTIVE BOX
Patient is a 79y old  Female who presents with a chief complaint of CHF exaceration, SOB (29 Aug 2019 14:55)      INTERVAL HPI/OVERNIGHT EVENTS: 80 yo F w/ PMH Non-Hodgkins lymphoma, High cholesterol, DM (on insulin),HTN, chronic Renal failure, angioplasty 2009, removal of R kidney and gall bladder 2011, chemo 2011 that presents to [facility] ED c/o SOB being admitted for SOB likely 2/2 to acute CHF exacerbation, found to have anemia with recent stool for OB + as out patient, suspect GIB . Seen and examined at bedside. Still has sob. Feels weak. Denies chest pain, palpitation.       MEDICATIONS  (STANDING):  amLODIPine   Tablet 10 milliGRAM(s) Oral daily  aspirin enteric coated 81 milliGRAM(s) Oral daily  atorvastatin 80 milliGRAM(s) Oral at bedtime  dextrose 5%. 1000 milliLiter(s) (50 mL/Hr) IV Continuous <Continuous>  dextrose 50% Injectable 12.5 Gram(s) IV Push once  dextrose 50% Injectable 25 Gram(s) IV Push once  dextrose 50% Injectable 25 Gram(s) IV Push once  furosemide   Injectable 40 milliGRAM(s) IV Push every 12 hours  hydrALAZINE 50 milliGRAM(s) Oral three times a day  insulin lispro (HumaLOG) corrective regimen sliding scale   SubCutaneous three times a day before meals  insulin lispro (HumaLOG) corrective regimen sliding scale   SubCutaneous at bedtime    MEDICATIONS  (PRN):  dextrose 40% Gel 15 Gram(s) Oral once PRN Blood Glucose LESS THAN 70 milliGRAM(s)/deciliter  glucagon  Injectable 1 milliGRAM(s) IntraMuscular once PRN Glucose LESS THAN 70 milligrams/deciliter      Allergies    No Known Allergies    Intolerances        REVIEW OF SYSTEMS:  CONSTITUTIONAL: No fever,  or fatigue  EYES: No eye pain, visual disturbances, or discharge  ENMT:  No difficulty hearing, tinnitus, vertigo; No sinus or throat pain  NECK: No pain or stiffness  RESPIRATORY: No cough, wheezing, chills or hemoptysis; + shortness of breath  CARDIOVASCULAR: No chest pain, palpitations, dizziness, or leg swelling  GASTROINTESTINAL: No abdominal or epigastric pain. No nausea, vomiting, or hematemesis; No diarrhea or constipation.   GENITOURINARY: No dysuria, frequency, hematuria, or incontinence  NEUROLOGICAL: No headaches, memory loss, loss of strength, numbness, or tremors  SKIN: No itching, burning, rashes, or lesions   LYMPH NODES: No enlarged glands  ENDOCRINE: No heat or cold intolerance; No hair loss; No polydipsia or polyuria  MUSCULOSKELETAL: No joint pain or swelling; No muscle, back, or extremity pain  HEME/LYMPH: No easy bruising, or bleeding gums  ALLERGY AND IMMUNOLOGIC: No hives or eczema    Vital Signs Last 24 Hrs  T(C): 36.7 (30 Aug 2019 07:21), Max: 36.9 (29 Aug 2019 17:16)  T(F): 98 (30 Aug 2019 07:21), Max: 98.4 (29 Aug 2019 17:16)  HR: 77 (30 Aug 2019 07:21) (74 - 88)  BP: 127/67 (30 Aug 2019 07:21) (127/67 - 182/60)  BP(mean): --  RR: 17 (30 Aug 2019 07:21) (13 - 17)  SpO2: 96% (30 Aug 2019 07:21) (94% - 98%)    PHYSICAL EXAM:  GENERAL: NAD, awake, Alert   HEAD:  Atraumatic, Normocephalic  EYES: EOMI, PERRLA, conjunctiva and sclera clear  ENMT: No tonsillar erythema, exudates, or enlargement; Moist mucous membranes  NECK: Supple, No JVD, Normal thyroid  NERVOUS SYSTEM:  Alert & Oriented X3, Good concentration; Motor Strength 5/5 B/L upper and lower extremities  CHEST/LUNG: Decrease  to auscultation bilaterally; No rales, rhonchi, wheezing, or rubs  HEART: S1S2+, Regular rate and rhythm  ABDOMEN: Soft, Nontender, Nondistended; Bowel sounds present  EXTREMITIES:  2+ Peripheral Pulses, No clubbing, cyanosis, or edema  LYMPH: No lymphadenopathy noted  SKIN: No rashes or lesions    LABS:                        7.8    7.40  )-----------( 299      ( 30 Aug 2019 06:08 )             25.9     30 Aug 2019 06:08    145    |  116    |  59     ----------------------------<  113    4.8     |  19     |  4.00     Ca    8.4        30 Aug 2019 06:08  Phos  5.1       30 Aug 2019 06:08    TPro  6.1    /  Alb  2.4    /  TBili  0.3    /  DBili  x      /  AST  27     /  ALT  29     /  AlkPhos  91     30 Aug 2019 06:08    PT/INR - ( 29 Aug 2019 10:30 )   PT: 12.3 sec;   INR: 1.08 ratio         PTT - ( 29 Aug 2019 10:30 )  PTT:38.2 sec  CAPILLARY BLOOD GLUCOSE      POCT Blood Glucose.: 117 mg/dL (30 Aug 2019 07:54)  POCT Blood Glucose.: 133 mg/dL (29 Aug 2019 21:30)    BLOOD CULTURE    RADIOLOGY & ADDITIONAL TESTS:    Imaging Personally Reviewed:  [ ] YES     Consultant(s) Notes Reviewed:      Care Discussed with Consultants/Other Providers: Patient is a 79y old  Female who presents with a chief complaint of CHF exaceration, SOB (29 Aug 2019 14:55)      INTERVAL HPI/OVERNIGHT EVENTS: 78 yo F w/ PMH Non-Hodgkins lymphoma, High cholesterol, DM (on insulin),HTN, chronic Renal failure, angioplasty 2009, removal of R kidney and gall bladder 2011, chemo 2011 that presents to [facility] ED c/o SOB being admitted for SOB likely 2/2 to acute CHF exacerbation, found to have anemia with recent stool for OB + as out patient, suspect GIB . Seen and examined at bedside. Still has sob. Feels weak. Denies chest pain, palpitation.       MEDICATIONS  (STANDING):  amLODIPine   Tablet 10 milliGRAM(s) Oral daily  aspirin enteric coated 81 milliGRAM(s) Oral daily  atorvastatin 80 milliGRAM(s) Oral at bedtime  dextrose 5%. 1000 milliLiter(s) (50 mL/Hr) IV Continuous <Continuous>  dextrose 50% Injectable 12.5 Gram(s) IV Push once  dextrose 50% Injectable 25 Gram(s) IV Push once  dextrose 50% Injectable 25 Gram(s) IV Push once  furosemide   Injectable 40 milliGRAM(s) IV Push every 12 hours  hydrALAZINE 50 milliGRAM(s) Oral three times a day  insulin lispro (HumaLOG) corrective regimen sliding scale   SubCutaneous three times a day before meals  insulin lispro (HumaLOG) corrective regimen sliding scale   SubCutaneous at bedtime    MEDICATIONS  (PRN):  dextrose 40% Gel 15 Gram(s) Oral once PRN Blood Glucose LESS THAN 70 milliGRAM(s)/deciliter  glucagon  Injectable 1 milliGRAM(s) IntraMuscular once PRN Glucose LESS THAN 70 milligrams/deciliter      Allergies    No Known Allergies    Intolerances        REVIEW OF SYSTEMS:  CONSTITUTIONAL: No fever,  or fatigue  EYES: No eye pain, visual disturbances, or discharge  ENMT:  No difficulty hearing, tinnitus, vertigo; No sinus or throat pain  NECK: No pain or stiffness  RESPIRATORY: No cough, wheezing, chills or hemoptysis; + shortness of breath  CARDIOVASCULAR: No chest pain, palpitations, dizziness, or leg swelling  GASTROINTESTINAL: No abdominal or epigastric pain. No nausea, vomiting, or hematemesis; No diarrhea or constipation.   GENITOURINARY: No dysuria, frequency, hematuria, or incontinence  NEUROLOGICAL: No headaches, memory loss, loss of strength, numbness, or tremors  SKIN: No itching, burning, rashes, or lesions   LYMPH NODES: No enlarged glands  ENDOCRINE: No heat or cold intolerance; No hair loss; No polydipsia or polyuria  MUSCULOSKELETAL: No joint pain or swelling; No muscle, back, or extremity pain  HEME/LYMPH: No easy bruising, or bleeding gums  ALLERGY AND IMMUNOLOGIC: No hives or eczema    Vital Signs Last 24 Hrs  T(C): 36.7 (30 Aug 2019 07:21), Max: 36.9 (29 Aug 2019 17:16)  T(F): 98 (30 Aug 2019 07:21), Max: 98.4 (29 Aug 2019 17:16)  HR: 77 (30 Aug 2019 07:21) (74 - 88)  BP: 127/67 (30 Aug 2019 07:21) (127/67 - 182/60)  BP(mean): --  RR: 17 (30 Aug 2019 07:21) (13 - 17)  SpO2: 96% (30 Aug 2019 07:21) (94% - 98%)    PHYSICAL EXAM:  GENERAL: NAD, awake, Alert   HEAD:  Atraumatic, Normocephalic  EYES: EOMI, PERRLA, conjunctiva and sclera clear  ENMT: No tonsillar erythema, exudates, or enlargement; Moist mucous membranes  NECK: Supple, No JVD, Normal thyroid  NERVOUS SYSTEM:  Alert & Oriented X3, Good concentration; Motor Strength 5/5 B/L upper and lower extremities  CHEST/LUNG: Decrease  to auscultation bilaterally; No rales, rhonchi, wheezing, or rubs  HEART: S1S2+, Regular rate and rhythm  ABDOMEN: Soft, Nontender, Nondistended; Bowel sounds present  EXTREMITIES:  2+ Peripheral Pulses, No clubbing, cyanosis, + bilat LE  edema  LYMPH: No lymphadenopathy noted  SKIN: No rashes or lesions    LABS:                        7.8    7.40  )-----------( 299      ( 30 Aug 2019 06:08 )             25.9     30 Aug 2019 06:08    145    |  116    |  59     ----------------------------<  113    4.8     |  19     |  4.00     Ca    8.4        30 Aug 2019 06:08  Phos  5.1       30 Aug 2019 06:08    TPro  6.1    /  Alb  2.4    /  TBili  0.3    /  DBili  x      /  AST  27     /  ALT  29     /  AlkPhos  91     30 Aug 2019 06:08    PT/INR - ( 29 Aug 2019 10:30 )   PT: 12.3 sec;   INR: 1.08 ratio         PTT - ( 29 Aug 2019 10:30 )  PTT:38.2 sec  CAPILLARY BLOOD GLUCOSE      POCT Blood Glucose.: 117 mg/dL (30 Aug 2019 07:54)  POCT Blood Glucose.: 133 mg/dL (29 Aug 2019 21:30)    BLOOD CULTURE    RADIOLOGY & ADDITIONAL TESTS:    Imaging Personally Reviewed:  [ ] YES     Consultant(s) Notes Reviewed:      Care Discussed with Consultants/Other Providers:

## 2019-08-30 NOTE — PROGRESS NOTE ADULT - PROBLEM SELECTOR PLAN 8
-DVT prophylaxis : SCD , since dropping h/h and concern for GIB will hold Lovenox   IMPROVE VTE Individual Risk Assessment  RISK                                                                Points  [  ] Previous VTE                                                  3  [  ] Thrombophilia                                               2  [  ] Lower limb paralysis                                      2        (unable to hold up >15 seconds)    [  ] Current Cancer                                              2         (within 6 months)  [  ] Immobilization > 24 hrs                                1  [  ] ICU/CCU stay > 24 hours                              1  [  ] Age > 60                                                      1  IMPROVE VTE Score ____1_____  IMPROVE Score 0-1: Low Risk, No VTE prophylaxis required for most patients, encourage ambulation.   IMPROVE Score 2-3: At risk, pharmacologic VTE prophylaxis is indicated for most patients (in the absence of a contraindication)  IMPROVE Score > or = 4: High Risk, pharmacologic VTE prophylaxis is indicated for most patients (in the absence of a contraindication) -continue home dose asa  -do not restart Clopidogrel as per cardio  -trop was 0.084, trending cardiac enzymes Q6 x2, f/u results.   -repeat EKG AM

## 2019-08-30 NOTE — CONSULT NOTE ADULT - SUBJECTIVE AND OBJECTIVE BOX
Chief Complaint:  Patient is a 79y old  Female who presents with a chief complaint of CHF exaceration, SOB (30 Aug 2019 09:19)    CAD S/P percutaneous coronary angioplasty  Non-Hodgkins lymphoma  High cholesterol  DM (diabetes mellitus)  HTN (hypertension)  Renal failure  History of cholecystectomy  History of right nephrectomy     HPI:  Pt is a 80 yo F w/ PMH Non-Hodgkins lymphoma, High cholesterol, DM (on insulin),HTN, chronic Renal failure, angioplasty , removal of R kidney and gall bladder , chemo  that presents to [facility] ED c/o SOB for the past 3-4 days. She explains that she has been traveling recently. She just got back from [place]. Her shortness of breath began during her trip. She explains she has never had this before. She was having trouble walking long distances before becoming out of breath and needing to stop walking. She rates the SOB 7/10. She also admits to leg swelling, LE spasms/pain, orthopnea and palpations. She recently had a positive FOBT outpatient and was scheduled for a colonoscopy but has yet to receive it. She denies abdominal pain, chest pain, dyspnea, numbness, tingling, syncope, dizziness.     In ED /60, HR 88, SPO2 98 on room air. No leukocytosis, afebrile.   BUN 51, Cr 3.7, Lactate 1.3, BNP 16,687, trop .084, H/H 9.2/30.7  EKG: Sinus rhythm, PVCs, PACs. LVH. LA enlargement.   CXR: small b/l pleural effusions and bibasilar atelectasis. (29 Aug 2019 14:55)    gi consulted for prior ob+ as op. chart reviewed. pt seen and examined.  hx of lymphoma. states sees gi doctor in [place], cannot recall name, had +ob stool as op. was planned for colonoscopy but required cardiac clearance prior and had stress test planned; however cancelled given sob and came to hospital. denies any overt s/s gib no hematemesis melena or brbpr. last bm 2 days ago and brown. denies dec po, wt loss, change in stool caliber. has never had egd/colon. hx of ccy and r nephrectomy. fhx- sister w colon ca dxd in 60s and another sister w gastric ca. takes asa,, deneis ac/nsaids. intermittent smoker, few per day.      recent vs/labs/imaging reviewed-  avss  hgb trend noted  plts and inr wnl        No Known Allergies      amLODIPine   Tablet 10 milliGRAM(s) Oral daily  aspirin enteric coated 81 milliGRAM(s) Oral daily  atorvastatin 80 milliGRAM(s) Oral at bedtime  dextrose 40% Gel 15 Gram(s) Oral once PRN  dextrose 5%. 1000 milliLiter(s) IV Continuous <Continuous>  dextrose 50% Injectable 12.5 Gram(s) IV Push once  dextrose 50% Injectable 25 Gram(s) IV Push once  dextrose 50% Injectable 25 Gram(s) IV Push once  furosemide   Injectable 40 milliGRAM(s) IV Push every 12 hours  glucagon  Injectable 1 milliGRAM(s) IntraMuscular once PRN  hydrALAZINE 50 milliGRAM(s) Oral three times a day  insulin lispro (HumaLOG) corrective regimen sliding scale   SubCutaneous three times a day before meals  insulin lispro (HumaLOG) corrective regimen sliding scale   SubCutaneous at bedtime  metoprolol tartrate 25 milliGRAM(s) Oral two times a day        FAMILY HISTORY:  FH: diabetes mellitus        Review of Systems:    General:  No wt loss, fevers, chills, night sweats, fatigue  Eyes:  Good vision, no reported pain  ENT:  No sore throat, pain, runny nose, dysphagia  CV:  No pain, palpitations, no lightheadedness  Resp:  sob  GI: see above  :  No pain, bleeding, incontinence, nocturia  Muscle:  No pain, weakness  Neuro:  No weakness, tingling, memory problems  Psych:  No fatigue, insomnia, mood problems, depression  Endocrine:  No polyuria, polydypsia, cold/heat intolerance  Heme:  No petechiae, ecchymosis, easy bruisability  Skin:  No rash, tattoos, scars, edema    Relevant Family History:   n/c    Relevant Social History: n/c      Physical Exam:    Vital Signs:  Vital Signs Last 24 Hrs  T(C): 36.7 (30 Aug 2019 07:21), Max: 36.9 (29 Aug 2019 17:16)  T(F): 98 (30 Aug 2019 07:21), Max: 98.4 (29 Aug 2019 17:16)  HR: 77 (30 Aug 2019 07:21) (74 - 80)  BP: 127/67 (30 Aug 2019 07:21) (127/67 - 170/76)  BP(mean): --  RR: 17 (30 Aug 2019 07:21) (16 - 17)  SpO2: 96% (30 Aug 2019 07:21) (94% - 98%)  Daily     Daily     General:  nad  HEENT:  NC/AT  Chest:  dec bs  Cardiovascular:  Regular rhythm, S1, S2  Abdomen:  Soft, non-tender, mild dt  Extremities:  no  edema  Skin:  No rash  Neuro/Psych:  A&Ox3    Laboratory:                            8.4    x     )-----------( x        ( 30 Aug 2019 10:02 )             27.3     08-30    145  |  116<H>  |  59<H>  ----------------------------<  113<H>  4.8   |  19<L>  |  4.00<H>    Ca    8.4<L>      30 Aug 2019 06:08  Phos  5.1     08-30    TPro  6.1  /  Alb  2.4<L>  /  TBili  0.3  /  DBili  x   /  AST  27  /  ALT  29  /  AlkPhos  91  08-30    LIVER FUNCTIONS - ( 30 Aug 2019 06:08 )  Alb: 2.4 g/dL / Pro: 6.1 g/dL / ALK PHOS: 91 U/L / ALT: 29 U/L / AST: 27 U/L / GGT: x           PT/INR - ( 29 Aug 2019 10:30 )   PT: 12.3 sec;   INR: 1.08 ratio         PTT - ( 29 Aug 2019 10:30 )  PTT:38.2 sec  Urinalysis Basic - ( 29 Aug 2019 15:13 )    Color: Pale Yellow / Appearance: Clear / S.005 / pH: x  Gluc: x / Ketone: Negative  / Bili: Negative / Urobili: Negative   Blood: x / Protein: 500 mg/dL / Nitrite: Negative   Leuk Esterase: Negative / RBC: 0-2 /HPF / WBC 0-2   Sq Epi: x / Non Sq Epi: Occasional / Bacteria: Negative        Imaging: Chief Complaint:  Patient is a 79y old  Female who presents with a chief complaint of CHF exaceration, SOB (30 Aug 2019 09:19)    CAD S/P percutaneous coronary angioplasty  Non-Hodgkins lymphoma  High cholesterol  DM (diabetes mellitus)  HTN (hypertension)  Renal failure  History of cholecystectomy  History of right nephrectomy     HPI:  Pt is a 78 yo F w/ PMH Non-Hodgkins lymphoma, High cholesterol, DM (on insulin),HTN, chronic Renal failure, angioplasty , removal of R kidney and gall bladder , chemo  that presents to [facility] ED c/o SOB for the past 3-4 days. She explains that she has been traveling recently. She just got back from [place]. Her shortness of breath began during her trip. She explains she has never had this before. She was having trouble walking long distances before becoming out of breath and needing to stop walking. She rates the SOB 7/10. She also admits to leg swelling, LE spasms/pain, orthopnea and palpations. She recently had a positive FOBT outpatient and was scheduled for a colonoscopy but has yet to receive it. She denies abdominal pain, chest pain, dyspnea, numbness, tingling, syncope, dizziness.     In ED /60, HR 88, SPO2 98 on room air. No leukocytosis, afebrile.   BUN 51, Cr 3.7, Lactate 1.3, BNP 16,687, trop .084, H/H 9.2/30.7  EKG: Sinus rhythm, PVCs, PACs. LVH. LA enlargement.   CXR: small b/l pleural effusions and bibasilar atelectasis. (29 Aug 2019 14:55)    gi consulted for prior ob+ as op. chart reviewed. pt seen and examined.  hx of lymphoma. states sees gi doctor in [place], cannot recall name, had +ob stool as op. was planned for colonoscopy but required cardiac clearance prior and had stress test planned; however cancelled given sob and came to hospital. denies any overt s/s gib no hematemesis melena or brbpr. last bm 2 days ago and brown. denies dec po, wt loss, change in stool caliber. denies f/c/n/v/d/c/abd pain. has never had egd/colon. hx of ccy and r nephrectomy. fhx- sister w colon ca dxd in 60s and another sister w gastric ca. takes asa,, deneis ac/nsaids. intermittent smoker, few per day.      recent vs/labs/imaging reviewed-  avss  hgb trend noted  plts and inr wnl        No Known Allergies      amLODIPine   Tablet 10 milliGRAM(s) Oral daily  aspirin enteric coated 81 milliGRAM(s) Oral daily  atorvastatin 80 milliGRAM(s) Oral at bedtime  dextrose 40% Gel 15 Gram(s) Oral once PRN  dextrose 5%. 1000 milliLiter(s) IV Continuous <Continuous>  dextrose 50% Injectable 12.5 Gram(s) IV Push once  dextrose 50% Injectable 25 Gram(s) IV Push once  dextrose 50% Injectable 25 Gram(s) IV Push once  furosemide   Injectable 40 milliGRAM(s) IV Push every 12 hours  glucagon  Injectable 1 milliGRAM(s) IntraMuscular once PRN  hydrALAZINE 50 milliGRAM(s) Oral three times a day  insulin lispro (HumaLOG) corrective regimen sliding scale   SubCutaneous three times a day before meals  insulin lispro (HumaLOG) corrective regimen sliding scale   SubCutaneous at bedtime  metoprolol tartrate 25 milliGRAM(s) Oral two times a day        FAMILY HISTORY:  FH: diabetes mellitus        Review of Systems:    General:  No wt loss, fevers, chills, night sweats, fatigue  Eyes:  Good vision, no reported pain  ENT:  No sore throat, pain, runny nose, dysphagia  CV:  No pain, palpitations, no lightheadedness  Resp:  sob  GI: see above  :  No pain, bleeding, incontinence, nocturia  Muscle:  No pain, weakness  Neuro:  No weakness, tingling, memory problems  Psych:  No fatigue, insomnia, mood problems, depression  Endocrine:  No polyuria, polydypsia, cold/heat intolerance  Heme:  No petechiae, ecchymosis, easy bruisability  Skin:  No rash, tattoos, scars, edema    Relevant Family History:   n/c    Relevant Social History: n/c      Physical Exam:    Vital Signs:  Vital Signs Last 24 Hrs  T(C): 36.7 (30 Aug 2019 07:21), Max: 36.9 (29 Aug 2019 17:16)  T(F): 98 (30 Aug 2019 07:21), Max: 98.4 (29 Aug 2019 17:16)  HR: 77 (30 Aug 2019 07:21) (74 - 80)  BP: 127/67 (30 Aug 2019 07:21) (127/67 - 170/76)  BP(mean): --  RR: 17 (30 Aug 2019 07:21) (16 - 17)  SpO2: 96% (30 Aug 2019 07:21) (94% - 98%)  Daily     Daily     General:  nad  HEENT:  NC/AT  Chest:  dec bs  Cardiovascular:  Regular rhythm, S1, S2  Abdomen:  Soft, non-tender, mild dt  Extremities:  no  edema  Skin:  No rash  Neuro/Psych:  A&Ox3    Laboratory:                            8.4    x     )-----------( x        ( 30 Aug 2019 10:02 )             27.3     08-30    145  |  116<H>  |  59<H>  ----------------------------<  113<H>  4.8   |  19<L>  |  4.00<H>    Ca    8.4<L>      30 Aug 2019 06:08  Phos  5.1     08-30    TPro  6.1  /  Alb  2.4<L>  /  TBili  0.3  /  DBili  x   /  AST  27  /  ALT  29  /  AlkPhos  91  08-30    LIVER FUNCTIONS - ( 30 Aug 2019 06:08 )  Alb: 2.4 g/dL / Pro: 6.1 g/dL / ALK PHOS: 91 U/L / ALT: 29 U/L / AST: 27 U/L / GGT: x           PT/INR - ( 29 Aug 2019 10:30 )   PT: 12.3 sec;   INR: 1.08 ratio         PTT - ( 29 Aug 2019 10:30 )  PTT:38.2 sec  Urinalysis Basic - ( 29 Aug 2019 15:13 )    Color: Pale Yellow / Appearance: Clear / S.005 / pH: x  Gluc: x / Ketone: Negative  / Bili: Negative / Urobili: Negative   Blood: x / Protein: 500 mg/dL / Nitrite: Negative   Leuk Esterase: Negative / RBC: 0-2 /HPF / WBC 0-2   Sq Epi: x / Non Sq Epi: Occasional / Bacteria: Negative        Imaging:

## 2019-08-30 NOTE — PROGRESS NOTE ADULT - ASSESSMENT
1.	CKD 4  2.	Dyspnea, Fluid overload  3.	Diabetes  4.	Hypertension  5.	Anemia    Renal function close to baseline. Creatinine trend rising with diuresis. Monitor blood sugar levels. Insulin coverage as needed. Dietary restriction. Monitor BP trend. Titrate BP meds as needed. Salt restriction. Monitor h/h trend. Procrit for anemia. PO bicarb.   Will follow electrolytes and renal function trend.

## 2019-08-30 NOTE — PROGRESS NOTE ADULT - SUBJECTIVE AND OBJECTIVE BOX
Patient is a 79y old  Female who presents with a chief complaint of CHF exaceration, SOB (30 Aug 2019 11:10)    Patient seen in follow up for CKD 4, SOB.     PAST MEDICAL HISTORY:  CAD S/P percutaneous coronary angioplasty  Non-Hodgkins lymphoma  High cholesterol  DM (diabetes mellitus)  HTN (hypertension)  Renal failure    MEDICATIONS  (STANDING):  amLODIPine   Tablet 10 milliGRAM(s) Oral daily  aspirin enteric coated 81 milliGRAM(s) Oral daily  atorvastatin 80 milliGRAM(s) Oral at bedtime  dextrose 5%. 1000 milliLiter(s) (50 mL/Hr) IV Continuous <Continuous>  dextrose 50% Injectable 12.5 Gram(s) IV Push once  dextrose 50% Injectable 25 Gram(s) IV Push once  dextrose 50% Injectable 25 Gram(s) IV Push once  furosemide   Injectable 40 milliGRAM(s) IV Push every 12 hours  hydrALAZINE 50 milliGRAM(s) Oral three times a day  insulin lispro (HumaLOG) corrective regimen sliding scale   SubCutaneous three times a day before meals  insulin lispro (HumaLOG) corrective regimen sliding scale   SubCutaneous at bedtime  metoprolol tartrate 25 milliGRAM(s) Oral two times a day  pantoprazole    Tablet 40 milliGRAM(s) Oral before breakfast    MEDICATIONS  (PRN):  dextrose 40% Gel 15 Gram(s) Oral once PRN Blood Glucose LESS THAN 70 milliGRAM(s)/deciliter  glucagon  Injectable 1 milliGRAM(s) IntraMuscular once PRN Glucose LESS THAN 70 milligrams/deciliter    T(C): 36.4 (19 @ 15:29), Max: 36.9 (19 @ 17:16)  HR: 62 (19 @ 15:29) (62 - 88)  BP: 121/60 (19 @ 15:29) (121/60 - 182/60)  RR: 17 (19 @ 15:29) (13 - 18)  SpO2: 96% (19 @ 15:29) (94% - 98%)  Wt(kg): --  I&O's Detail    29 Aug 2019 07:01  -  30 Aug 2019 07:00  --------------------------------------------------------  IN:    Oral Fluid: 240 mL  Total IN: 240 mL    OUT:  Total OUT: 0 mL    Total NET: 240 mL          PHYSICAL EXAM:  General: NAD  Respiratory: b/l air entry  Cardiovascular: S1 S2  Gastrointestinal: soft  Extremities:  edema                          8.4    x     )-----------( x        ( 30 Aug 2019 10:02 )             27.3         145  |  116<H>  |  59<H>  ----------------------------<  113<H>  4.8   |  19<L>  |  4.00<H>    Ca    8.4<L>      30 Aug 2019 06:08  Phos  5.1         TPro  6.1  /  Alb  2.4<L>  /  TBili  0.3  /  DBili  x   /  AST  27  /  ALT  29  /  AlkPhos  91      CARDIAC MARKERS ( 29 Aug 2019 22:50 )  .088 ng/mL / x     / 235 U/L / x     / 7.0 ng/mL  CARDIAC MARKERS ( 29 Aug 2019 16:44 )  .090 ng/mL / x     / 256 U/L / x     / 7.3 ng/mL  CARDIAC MARKERS ( 29 Aug 2019 10:30 )  .084 ng/mL / x     / x     / x     / x          LIVER FUNCTIONS - ( 30 Aug 2019 06:08 )  Alb: 2.4 g/dL / Pro: 6.1 g/dL / ALK PHOS: 91 U/L / ALT: 29 U/L / AST: 27 U/L / GGT: x           Urinalysis Basic - ( 29 Aug 2019 15:13 )    Color: Pale Yellow / Appearance: Clear / S.005 / pH: x  Gluc: x / Ketone: Negative  / Bili: Negative / Urobili: Negative   Blood: x / Protein: 500 mg/dL / Nitrite: Negative   Leuk Esterase: Negative / RBC: 0-2 /HPF / WBC 0-2   Sq Epi: x / Non Sq Epi: Occasional / Bacteria: Negative    Sodium, Serum: 145 ( @ 06:08)  Sodium, Serum: 145 ( @ 10:30)    Creatinine, Serum: 4.00 ( @ 06:08)  Creatinine, Serum: 3.70 ( @ 10:30)    Potassium, Serum: 4.8 ( @ 06:08)  Potassium, Serum: 5.0 ( @ 10:30)    Hemoglobin: 8.4 ( @ 10:02)  Hemoglobin: 7.8 ( @ 06:08)  Hemoglobin: 9.2 ( @ 10:30)

## 2019-08-30 NOTE — PROGRESS NOTE ADULT - PROBLEM SELECTOR PLAN 6
-continue home dose amlodipine, hydralazine w/ hold parameters.  -monitor bp -dash diet  - f/u lipid panel   -continue home dose atorvastatin

## 2019-08-30 NOTE — PROGRESS NOTE ADULT - PROBLEM SELECTOR PLAN 3
-H/H: 9.2/30.7 upon admission and now dropping more  -F/u repeat h/h  -Type and screen sent  -D/c Lovenox for dvt ppx and put on scd for now   -FOBT positive as out patient, was scheduled for outpatient colonoscopy, did not yet receive test.  -Will check stool for occult blood   -Consult GI, Dr. Garcia called. Will probably need  inpatient colonoscopy once CHF exacerbation managed and patient optimized. -H/H: 9.2/30.7 upon admission and now dropping more  -F/u repeat h/h  -Type and screen sent  -D/c Lovenox for dvt ppx and put on scd for now   -Will continue baby aspirin for now and monitor h/h and f/u GI and Cardio recommendations   -FOBT positive as out patient, was scheduled for outpatient colonoscopy, did not yet receive test.  -Will check stool for occult blood   -Consult GI, Dr. Garcia called. Will probably need  inpatient colonoscopy once CHF exacerbation managed and patient optimized. -BUN/Cr: 51/3.7, baseline 7/2019 ranges from 3-5.   -Acute on chronic renal failure  -Renal diet, fluid restriction to 2 L  -Monitor BMP, Cr, K in AM  -Nephro consulted, f/u recs  -avoid nephrotoxic meds

## 2019-08-30 NOTE — PROGRESS NOTE ADULT - PROBLEM SELECTOR PLAN 7
-continue home dose asa  -do not restart Clopidogrel as per cardio  -trop was 0.084, trending cardiac enzymes Q6 x2, f/u results.   -repeat EKG AM -continue home dose amlodipine, hydralazine w/ hold parameters.  -monitor bp

## 2019-08-30 NOTE — PROGRESS NOTE ADULT - SUBJECTIVE AND OBJECTIVE BOX
[facility] -- Flaca Summers, Missael Lombardo Pannella, Patel, Savella  Office # [phone]      Follow Up:      Subjective/Observations:       REVIEW OF SYSTEMS: All other review of systems is negative unless indicated above    PAST MEDICAL & SURGICAL HISTORY:  CAD S/P percutaneous coronary angioplasty  Non-Hodgkins lymphoma  High cholesterol  DM (diabetes mellitus)  HTN (hypertension)  Renal failure  History of cholecystectomy  History of right nephrectomy      MEDICATIONS  (STANDING):  amLODIPine   Tablet 10 milliGRAM(s) Oral daily  aspirin enteric coated 81 milliGRAM(s) Oral daily  atorvastatin 80 milliGRAM(s) Oral at bedtime  dextrose 5%. 1000 milliLiter(s) (50 mL/Hr) IV Continuous <Continuous>  dextrose 50% Injectable 12.5 Gram(s) IV Push once  dextrose 50% Injectable 25 Gram(s) IV Push once  dextrose 50% Injectable 25 Gram(s) IV Push once  furosemide   Injectable 40 milliGRAM(s) IV Push every 12 hours  hydrALAZINE 50 milliGRAM(s) Oral three times a day  insulin lispro (HumaLOG) corrective regimen sliding scale   SubCutaneous three times a day before meals  insulin lispro (HumaLOG) corrective regimen sliding scale   SubCutaneous at bedtime    MEDICATIONS  (PRN):  dextrose 40% Gel 15 Gram(s) Oral once PRN Blood Glucose LESS THAN 70 milliGRAM(s)/deciliter  glucagon  Injectable 1 milliGRAM(s) IntraMuscular once PRN Glucose LESS THAN 70 milligrams/deciliter      Allergies    No Known Allergies    Intolerances        Vital Signs Last 24 Hrs  T(C): 36.7 (30 Aug 2019 07:21), Max: 36.9 (29 Aug 2019 17:16)  T(F): 98 (30 Aug 2019 07:21), Max: 98.4 (29 Aug 2019 17:16)  HR: 77 (30 Aug 2019 07:21) (74 - 88)  BP: 127/67 (30 Aug 2019 07:21) (127/67 - 182/60)  BP(mean): --  RR: 17 (30 Aug 2019 07:21) (13 - 17)  SpO2: 96% (30 Aug 2019 07:21) (94% - 98%)    I&O's Summary    29 Aug 2019 07:01  -  30 Aug 2019 07:00  --------------------------------------------------------  IN: 240 mL / OUT: 0 mL / NET: 240 mL      Weight (kg): 53.5 (08-29 @ 09:36)    PHYSICAL EXAM:  TELE: NSR 81bpm   Constitutional: NAD, awake and alert, well-developed  HEENT: Moist Mucous Membranes, Anicteric  Pulmonary: Non-labored , bibasilar rales LEFT > RIGHT   Cardiovascular: Regular, S1 and S2 nl, + WHITNEY  Gastrointestinal: Bowel Sounds present, soft, nontender.   Lymph: Trace LE edema   Skin: No visible rashes or ulcers.  Psych:  Mood & affect appropriate    LABS: All Labs Reviewed:                        7.8    7.40  )-----------( 299      ( 30 Aug 2019 06:08 )             25.9                         9.2    8.13  )-----------( 366      ( 29 Aug 2019 10:30 )             30.7     30 Aug 2019 06:08    145    |  116    |  59     ----------------------------<  113    4.8     |  19     |  4.00   29 Aug 2019 10:30    145    |  116    |  51     ----------------------------<  155    5.0     |  18     |  3.70     Ca    8.4        30 Aug 2019 06:08  Ca    8.8        29 Aug 2019 10:30  Phos  5.1       30 Aug 2019 06:08    TPro  6.1    /  Alb  2.4    /  TBili  0.3    /  DBili  x      /  AST  27     /  ALT  29     /  AlkPhos  91     30 Aug 2019 06:08  TPro  7.4    /  Alb  3.0    /  TBili  0.4    /  DBili  x      /  AST  38     /  ALT  39     /  AlkPhos  117    29 Aug 2019 10:30    PT/INR - ( 29 Aug 2019 10:30 )   PT: 12.3 sec;   INR: 1.08 ratio         PTT - ( 29 Aug 2019 10:30 )  PTT:38.2 sec  CARDIAC MARKERS ( 29 Aug 2019 22:50 )  .088 ng/mL / x     / 235 U/L / x     / 7.0 ng/mL  CARDIAC MARKERS ( 29 Aug 2019 16:44 )  .090 ng/mL / x     / 256 U/L / x     / 7.3 ng/mL  CARDIAC MARKERS ( 29 Aug 2019 10:30 )  .084 ng/mL / x     / x     / x     / x             ECG:  < from: 12 Lead ECG (08.29.19 @ 09:59) >  Ventricular Rate 88 BPM    Atrial Rate 88 BPM    P-R Interval 180 ms    QRS Duration 94 ms    Q-T Interval 386 ms    QTC Calculation(Bezet) 467 ms    P Axis 33 degrees    R Axis 6 degrees    T Axis 78 degrees    Diagnosis Line Sinus rhythm with occasional premature ventricular complexes and premature atrial complexes  Possible Left atrial enlargement  Left ventricular hypertrophy with repolarization abnormality    < end of copied text >        Echo:  < from: TTE Echo Doppler w/o Cont (08.29.19 @ 16:00) >    Normal left ventricular internal dimensions and systolic function,   estimated LVEF of 65%.   Grossly normal RV size and systolic function.   Biatrial enlargement  Calcified trileaflet aortic valve with decreased opening. Aortic valve   area is calculated to be 1.2 sq cm, consistent with moderate aortic   stenosis  Mild to moderate MR   Moderate TR.    Estimated PA systolic pressure of 52 mmHg.    No significant pericardial effusion.  Large bilateral pleural effusions        < end of copied text >      Radiology:  < from: Xray Chest 1 View- PORTABLE-Urgent (08.29.19 @ 10:18) >    The cardiac silhouette is enlarged. The aorta is tortuous and   atherosclerotic. There are small bilateral pleural effusions and   bibasilar atelectasis, left greater than right. There is no pneumothorax.     IMPRESSION:     Small bilateral pleural effusions and bibasilar atelectasis.     < end of copied text >

## 2019-08-30 NOTE — CONSULT NOTE ADULT - SUBJECTIVE AND OBJECTIVE BOX
Date/Time Patient Seen:  		  Referring MD:   Data Reviewed	       Patient is a 79y old  Female who presents with a chief complaint of CHF exaceration, SOB (30 Aug 2019 11:10)      Subjective/HPI  in bed  seen and examined  vs and meds reviewed  H and P reviewed  labs reviewed  TTE reviewed  ER provider note reviewed    imaging and labs reviewed    asked to see pt for eval of sob and pleural eff     History of Present Illness:  Reason for Admission: CHF exaceration, SOB  History of Present Illness:   Pt is a 80 yo F w/ PMH Non-Hodgkins lymphoma, High cholesterol, DM (on insulin),HTN, chronic Renal failure, angioplasty 2009, removal of R kidney and gall bladder 2011, chemo 2011 that presents to [facility] ED c/o SOB for the past 3-4 days. She explains that she has been traveling recently. She just got back from [place]. Her shortness of breath began during her trip. She explains she has never had this before. She was having trouble walking long distances before becoming out of breath and needing to stop walking. She rates the SOB 7/10. She also admits to leg swelling, LE spasms/pain, orthopnea and palpations. She recently had a positive FOBT outpatient and was scheduled for a colonoscopy but has yet to receive it. She denies abdominal pain, chest pain, dyspnea, numbness, tingling, syncope, dizziness.       TN (hypertension)     Non-Hodgkins lymphoma     Renal failure.     PAST SURGICAL HISTORY:  History of cholecystectomy     History of right nephrectomy.     FAMILY HISTORY:  FH: diabetes mellitus.     Social History:  Social History (marital status, living situation, occupation, tobacco use, alcohol and drug use, and sexual history): Pt lives at home with daughter. Recent travel. Current smoker, 2-3 cigarettes per day. No alcohol or drug use. Ambulates independently w/ walker.     Tobacco Screening:  · Core Measure Site	Yes  · Has the patient used tobacco in the past 30 days?	Yes  · Tobacco Cessation Education/Counseling	Offered and provided  · Tobacco Cessation Medication	Offered and patient declined       PAST MEDICAL & SURGICAL HISTORY:  CAD S/P percutaneous coronary angioplasty  Non-Hodgkins lymphoma  High cholesterol  DM (diabetes mellitus)  HTN (hypertension)  Renal failure  History of cholecystectomy  History of right nephrectomy        Medication list         MEDICATIONS  (STANDING):  amLODIPine   Tablet 10 milliGRAM(s) Oral daily  aspirin enteric coated 81 milliGRAM(s) Oral daily  atorvastatin 80 milliGRAM(s) Oral at bedtime  dextrose 5%. 1000 milliLiter(s) (50 mL/Hr) IV Continuous <Continuous>  dextrose 50% Injectable 12.5 Gram(s) IV Push once  dextrose 50% Injectable 25 Gram(s) IV Push once  dextrose 50% Injectable 25 Gram(s) IV Push once  furosemide   Injectable 40 milliGRAM(s) IV Push every 12 hours  hydrALAZINE 50 milliGRAM(s) Oral three times a day  insulin lispro (HumaLOG) corrective regimen sliding scale   SubCutaneous three times a day before meals  insulin lispro (HumaLOG) corrective regimen sliding scale   SubCutaneous at bedtime  metoprolol tartrate 25 milliGRAM(s) Oral two times a day  pantoprazole    Tablet 40 milliGRAM(s) Oral before breakfast    MEDICATIONS  (PRN):  dextrose 40% Gel 15 Gram(s) Oral once PRN Blood Glucose LESS THAN 70 milliGRAM(s)/deciliter  glucagon  Injectable 1 milliGRAM(s) IntraMuscular once PRN Glucose LESS THAN 70 milligrams/deciliter         Vitals log        ICU Vital Signs Last 24 Hrs  T(C): 36.4 (30 Aug 2019 15:29), Max: 36.9 (29 Aug 2019 17:16)  T(F): 97.6 (30 Aug 2019 15:29), Max: 98.4 (29 Aug 2019 17:16)  HR: 62 (30 Aug 2019 15:29) (62 - 80)  BP: 121/60 (30 Aug 2019 15:29) (121/60 - 164/72)  BP(mean): --  ABP: --  ABP(mean): --  RR: 17 (30 Aug 2019 15:29) (16 - 18)  SpO2: 96% (30 Aug 2019 15:29) (94% - 98%)           Input and Output:  I&O's Detail    29 Aug 2019 07:01  -  30 Aug 2019 07:00  --------------------------------------------------------  IN:    Oral Fluid: 240 mL  Total IN: 240 mL    OUT:  Total OUT: 0 mL    Total NET: 240 mL          Lab Data                        8.4    x     )-----------( x        ( 30 Aug 2019 10:02 )             27.3     08-30    145  |  116<H>  |  59<H>  ----------------------------<  113<H>  4.8   |  19<L>  |  4.00<H>    Ca    8.4<L>      30 Aug 2019 06:08  Phos  5.1     08-30    TPro  6.1  /  Alb  2.4<L>  /  TBili  0.3  /  DBili  x   /  AST  27  /  ALT  29  /  AlkPhos  91  08-30      CARDIAC MARKERS ( 29 Aug 2019 22:50 )  .088 ng/mL / x     / 235 U/L / x     / 7.0 ng/mL  CARDIAC MARKERS ( 29 Aug 2019 16:44 )  .090 ng/mL / x     / 256 U/L / x     / 7.3 ng/mL  CARDIAC MARKERS ( 29 Aug 2019 10:30 )  .084 ng/mL / x     / x     / x     / x            Review of Systems	  sob  winston    Objective     Physical Examination    heart s1s2  lung dec BS  abd soft      Pertinent Lab findings & Imaging      Miguel Ángel:  NO   Adequate UO     I&O's Detail    29 Aug 2019 07:01  -  30 Aug 2019 07:00  --------------------------------------------------------  IN:    Oral Fluid: 240 mL  Total IN: 240 mL    OUT:  Total OUT: 0 mL    Total NET: 240 mL               Discussed with:     Cultures:	        Radiology    EXAM:  US CHEST                            PROCEDURE DATE:  08/30/2019          INTERPRETATION:  Ultrasound chest    Clinical indication evaluate effusion size    Technique: Grayscale sonography of the bilateral chest was performed for   evaluation of effusion size.    Comparison: Chest radiograph 8/29/2019    Findings:    Right pleural effusion measures approximately 76 cc and the left pleural   effusion measures approximately 97 cc.    Impression:    Right pleural effusion measures approximately 76 cc and the left pleural   effusion measures approximately 97 cc's.                  NILAY LEMONS M.D., ATTENDING RADIOLOGIST  This document has been electronically signed. Aug 30 2019  1:22PM            EXAM:  ECHO TTE WO CON COMP W DOPPLR         PROCEDURE DATE:  08/29/2019        INTERPRETATION:  INDICATION: Dyspnea    Blood Pressure 170/76    Height 154 cm     Weight 53.5 kg       BSA 1.5   sq m    Dimensions:    LA 4.1       Normal Values: 2.0 - 4.0 cm    Ao 3.0        Normal Values: 2.0 - 3.8 cm  SEPTUM 0.8       Normal Values: 0.6 - 1.2 cm  PWT 0.8       Normal Values: 0.6 - 1.1 cm  LVIDd 4.6         Normal Values: 3.0 - 5.6 cm  LVIDs 3.7         Normal Values: 1.8 - 4.0 cm      OBSERVATIONS:    Mitral Valve: normal, mild to moderate MR.  Aortic Valve/Aorta: Calcified trileaflet aortic valve with decreased   opening. Aortic valve area is calculated to be 1.2 sq cm, consistent with   moderate aortic stenosis  Tricuspid Valve: normal with moderate TR.  Pulmonic Valve: normal  Left Atrium: Enlarged  Right Atrium: Enlarged  Left Ventricle: normal LV size and systolic function, estimated LVEF of   65%.  Right Ventricle: Grossly normal size and systolic function.  Pericardium/Pleura: normal, no significant pericardial effusion.  Pulmonary/RV Pressure: estimated PA systolic pressure of 52 mmHg   LV diastolic dysfunction is present  Large bilateral pleural effusions    Conclusion:     Normal left ventricular internal dimensions and systolic function,   estimated LVEF of 65%.   Grossly normal RV size and systolic function.   Biatrial enlargement  Calcified trileaflet aortic valve with decreased opening. Aortic valve   area is calculated to be 1.2 sq cm, consistent with moderate aortic   stenosis  Mild to moderate MR   Moderate TR.    Estimated PA systolic pressure of 52 mmHg.    No significant pericardial effusion.  Large bilateral pleural effusions                  YAYO MENDOZA   This document has been electronically signed. Aug 30 2019  8:25AM

## 2019-08-30 NOTE — PROGRESS NOTE ADULT - PROBLEM SELECTOR PLAN 1
-SOB likely 2/2 to acute CHF exacerbation, pt has no history of CHF.   -Previous TTE in 2009, EF 75%  -Pt BNP 16,687 in ED  -Pt appears fluid overloaded w/ orthopnea, SOB and LE pitting edema.  -CXR revealed b/l pleural effusion and bibasilar atelectasis.  -Cardio follow up, Dr. Lombardo to see patient   -Cardio rec IV 40mg Lasix Q 12, appreciate recs.  -Cardio rec obtain TTE, f/u results.

## 2019-08-30 NOTE — PROGRESS NOTE ADULT - ASSESSMENT
80 y/o female current smoker with Hx of non-Hodgkin's lymphoma (last chemo in 2009), right kidney Ca s/p nephrectomy (2009), CAD s/p stents to the RCA and LAD x 2 (20019), HTN, DM2, HLD, and CKD stage 4 (s/p left AVG not has been used) presented to the ED c/o worsening SOB, MO, and orthopnea.     Diastolic Heart Failure  -Hypervolemi on exam with Rales and LE edema, with improvement in symptoms   -Please keep accurate Intake and output  -Continue with IV lasix  - Daily standing weights only  - Monitor electrolytes, replete to keep K>4 and Mag>2  -2d echo as above revealing  LVEF 65%, moderate aortic stenosis  1.2 sq cm, mild to moderate MR, moderate TR, LARGE bilateral pleural effusion      CAD  - She has no acute anginal symptoms  - She is s/p stents (RCA, LAD x 2) in 2009  - Continue ASA  - Continue statin  -Mild troponin leak in setting of CKD and hypervolemia     CKD  -  - Continue to monitor renal indices  - Avoid nephrotoxics  - She had an AV graft that has not been used yet  - Renal following.  Per Renal, no indication for HD right now.    HTN  - Continue Hydralazine 50mg q8H and Norvasc    HLD  - Continue statin    DM2    - FS AC and HS  - Care per Primary    Anemia likely from chronic kidney disease  Anemia work up as per primary tream       DVT ppx  - Per Primary    Adriane Daniels FNP-C  Cardiology NP  [id number] [phone] 78 y/o female current smoker with Hx of non-Hodgkin's lymphoma (last chemo in 2009), right kidney Ca s/p nephrectomy (2009), CAD s/p stents to the RCA and LAD x 2 (20019), HTN, DM2, HLD, and CKD stage 4 (s/p left AVG not has been used) presented to the ED c/o worsening SOB, MO, and orthopnea.     Diastolic Heart Failure  -Hypervolemic on exam with Rales and LE edema, with improvement in symptoms   -Please keep accurate Intake and output  -Continue with IV lasix  - Daily standing weights only  - Monitor electrolytes, replete to keep K>4 and Mag>2  -2d echo as above revealing  LVEF 65%, moderate aortic stenosis  1.2 sq cm, mild to moderate MR, moderate TR, LARGE bilateral pleural effusion      CAD  - She has no acute anginal symptoms  - She is s/p stents (RCA, LAD x 2) in 2009  - Continue ASA  - Continue statin  -Mild troponin leak in setting of CKD and hypervolemia     CKD  -  - Continue to monitor renal indices  - Avoid nephrotoxics  - She had an AV graft that has not been used yet  - Renal following.  Per Renal, no indication for HD right now.    HTN  - Continue Hydralazine 50mg q8H and Norvasc    HLD  - Continue statin    DM2    - FS AC and HS  - Care per Primary    Anemia   Anemia work up as per primary team      DVT ppx  - Per Primary    Adriane Daniels FNP-C  Cardiology NP  [id number] [phone]

## 2019-08-30 NOTE — CHART NOTE - NSCHARTNOTEFT_GEN_A_CORE
Called by RN for WCT that appear to be aberrantly conducted.  Patient also had at least 10 sec of same overnight.  Patient denies palpitations, however, still c/o MO.  Still mildly volume overloaded, though, significantly improved from yesterday on presentation.  Will start on Lopressor 25 mg q12H.      Mindy Young [facility] [place]  Cardiology  Spectra# [phone] Called by RN for WCT that appear to be aberrantly conducted.  Patient also had at least 10 sec of same overnight.  Patient denies palpitations, however, still c/o MO.  Still mildly volume overloaded, though, significantly improved from yesterday on presentation.  Will start on Lopressor 25 mg q12H.  Follow up TTE.  She has mildly elevated troponins and CPK's which may be a reflection of a demand ischemia secondary to her HF and ESRD.  However, it is reasonable for an ischemic eval, an outpatient nuclear stress test, at the most.  She follows with Dr. Fiona Soler.  Discussed with Dr. Lombardo.    Mindy Young [facility]  Cardiology  Spectra# [phone]

## 2019-08-30 NOTE — PROGRESS NOTE ADULT - PROBLEM SELECTOR PLAN 5
-dash diet  - f/u lipid panel   -continue home dose atorvastatin -Consistent carb diet, renal and dash.   -hypoglycemic protocol in place  -sliding scale insulin  - Will check HbA1C

## 2019-08-30 NOTE — PROGRESS NOTE ADULT - PROBLEM SELECTOR PLAN 2
-BUN/Cr: 51/3.7, baseline 7/2019 ranges from 3-5.   -Acute on chronic renal failure  -Renal diet, fluid restriction to 2 L  -Monitor BMP, Cr, K in AM  -Nephro consulted, f/u recs  -avoid nephrotoxic meds Noted on TTE , bilat large pleural effusions  Might need thoracentesis  Continue IV Nacho Daniel to see patient

## 2019-08-30 NOTE — CONSULT NOTE ADULT - PROBLEM SELECTOR RECOMMENDATION 9
ckd - gonzalo  cad - heart disease - ashd -   HF - valvular heart disease  sob - winston -   pleural eff - small - atelectasis -   TTE and US and CXR reviewed  labs noted  Cardio and GI eval noted  on BID IV LASIX  I and O  replete lytes  monitor vs and HD and Sat  BP control  dietary discretion  no need for thoracentesis at present  will follow  will monitor  cont tele monitoring

## 2019-08-30 NOTE — PROGRESS NOTE ADULT - ASSESSMENT
Pt is a 80 yo F w/ PMH Non-Hodgkins lymphoma, High cholesterol, DM (on insulin),HTN, chronic Renal failure, angioplasty 2009, removal of R kidney and gall bladder 2011, chemo 2011 that presents to [facility] ED c/o SOB being admitted for SOB likely 2/2 to acute CHF exacerbation, found to have anemia with recent stool for OB + as out patient, suspect GIB . Pt is a 78 yo F w/ PMH Non-Hodgkins lymphoma, High cholesterol, DM (on insulin),HTN, chronic Renal failure, angioplasty 2009, removal of R kidney and gall bladder 2011, chemo 2011 that presents to [facility] ED c/o SOB being admitted for SOB likely 2/2 to acute CHF exacerbation, bilat pleural effusions,  found to have anemia with recent stool for OB + as out patient, suspect GIB .

## 2019-08-30 NOTE — PROGRESS NOTE ADULT - PROBLEM SELECTOR PLAN 4
-Consistent carb diet, renal and dash.   -hypoglycemic protocol in place  -sliding scale insulin  - Will check HbA1C -H/H: 9.2/30.7 upon admission and now dropping more  -F/u repeat h/h  -Type and screen sent  -D/c Lovenox for dvt ppx and put on scd for now   -Will continue baby aspirin for now and monitor h/h and f/u GI and Cardio recommendations   -FOBT positive as out patient, was scheduled for outpatient colonoscopy, did not yet receive test.  -Will check stool for occult blood   -Consult GI, Dr. Garcia called. Will probably need  inpatient colonoscopy once CHF exacerbation managed and patient optimized.

## 2019-08-31 LAB
ALBUMIN SERPL ELPH-MCNC: 2.3 G/DL — LOW (ref 3.3–5)
ALP SERPL-CCNC: 82 U/L — SIGNIFICANT CHANGE UP (ref 40–120)
ALT FLD-CCNC: 29 U/L — SIGNIFICANT CHANGE UP (ref 12–78)
ANION GAP SERPL CALC-SCNC: 11 MMOL/L — SIGNIFICANT CHANGE UP (ref 5–17)
AST SERPL-CCNC: 23 U/L — SIGNIFICANT CHANGE UP (ref 15–37)
BILIRUB SERPL-MCNC: 0.3 MG/DL — SIGNIFICANT CHANGE UP (ref 0.2–1.2)
BUN SERPL-MCNC: 64 MG/DL — HIGH (ref 7–23)
CALCIUM SERPL-MCNC: 8.1 MG/DL — LOW (ref 8.5–10.1)
CHLORIDE SERPL-SCNC: 115 MMOL/L — HIGH (ref 96–108)
CO2 SERPL-SCNC: 19 MMOL/L — LOW (ref 22–31)
CREAT SERPL-MCNC: 4.3 MG/DL — HIGH (ref 0.5–1.3)
GLUCOSE SERPL-MCNC: 104 MG/DL — HIGH (ref 70–99)
HCT VFR BLD CALC: 25.1 % — LOW (ref 34.5–45)
HCT VFR BLD CALC: 26 % — LOW (ref 34.5–45)
HGB BLD-MCNC: 7.6 G/DL — LOW (ref 11.5–15.5)
HGB BLD-MCNC: 7.9 G/DL — LOW (ref 11.5–15.5)
MCHC RBC-ENTMCNC: 27.5 PG — SIGNIFICANT CHANGE UP (ref 27–34)
MCHC RBC-ENTMCNC: 30.3 GM/DL — LOW (ref 32–36)
MCV RBC AUTO: 90.9 FL — SIGNIFICANT CHANGE UP (ref 80–100)
NRBC # BLD: 0 /100 WBCS — SIGNIFICANT CHANGE UP (ref 0–0)
OB PNL STL: POSITIVE
PLATELET # BLD AUTO: 266 K/UL — SIGNIFICANT CHANGE UP (ref 150–400)
POTASSIUM SERPL-MCNC: 5.1 MMOL/L — SIGNIFICANT CHANGE UP (ref 3.5–5.3)
POTASSIUM SERPL-SCNC: 5.1 MMOL/L — SIGNIFICANT CHANGE UP (ref 3.5–5.3)
PROT SERPL-MCNC: 5.6 G/DL — LOW (ref 6–8.3)
RBC # BLD: 2.76 M/UL — LOW (ref 3.8–5.2)
RBC # FLD: 18.3 % — HIGH (ref 10.3–14.5)
SODIUM SERPL-SCNC: 145 MMOL/L — SIGNIFICANT CHANGE UP (ref 135–145)
WBC # BLD: 7.71 K/UL — SIGNIFICANT CHANGE UP (ref 3.8–10.5)
WBC # FLD AUTO: 7.71 K/UL — SIGNIFICANT CHANGE UP (ref 3.8–10.5)

## 2019-08-31 PROCEDURE — 99232 SBSQ HOSP IP/OBS MODERATE 35: CPT

## 2019-08-31 PROCEDURE — 99233 SBSQ HOSP IP/OBS HIGH 50: CPT

## 2019-08-31 RX ORDER — FUROSEMIDE 40 MG
40 TABLET ORAL DAILY
Refills: 0 | Status: DISCONTINUED | OUTPATIENT
Start: 2019-09-01 | End: 2019-09-03

## 2019-08-31 RX ADMIN — Medication 0: at 22:24

## 2019-08-31 RX ADMIN — Medication 25 MILLIGRAM(S): at 06:12

## 2019-08-31 RX ADMIN — Medication 40 MILLIGRAM(S): at 06:12

## 2019-08-31 RX ADMIN — ATORVASTATIN CALCIUM 80 MILLIGRAM(S): 80 TABLET, FILM COATED ORAL at 22:28

## 2019-08-31 RX ADMIN — Medication 50 MILLIGRAM(S): at 22:28

## 2019-08-31 RX ADMIN — IRON SUCROSE 100 MILLIGRAM(S): 20 INJECTION, SOLUTION INTRAVENOUS at 18:23

## 2019-08-31 RX ADMIN — Medication 25 MILLIGRAM(S): at 18:23

## 2019-08-31 RX ADMIN — Medication 50 MILLIGRAM(S): at 14:23

## 2019-08-31 RX ADMIN — PANTOPRAZOLE SODIUM 40 MILLIGRAM(S): 20 TABLET, DELAYED RELEASE ORAL at 06:12

## 2019-08-31 RX ADMIN — AMLODIPINE BESYLATE 10 MILLIGRAM(S): 2.5 TABLET ORAL at 06:12

## 2019-08-31 RX ADMIN — Medication 50 MILLIGRAM(S): at 06:12

## 2019-08-31 RX ADMIN — Medication 81 MILLIGRAM(S): at 12:38

## 2019-08-31 NOTE — PROGRESS NOTE ADULT - SUBJECTIVE AND OBJECTIVE BOX
Patient is a 79y old  Female who presents with a chief complaint of CHF exaceration, SOB (31 Aug 2019 08:50)       INTERVAL HPI/OVERNIGHT EVENTS: 78 yo F w/ PMH Non-Hodgkins lymphoma, High cholesterol, DM (on insulin),HTN, chronic Renal failure, angioplasty 2009, removal of R kidney and gall bladder 2011, chemo 2011 that presents to [facility] ED c/o SOB being admitted for SOB likely 2/2 to acute CHF exacerbation, bilat pleural effusions,  found to have anemia with recent stool for OB + as out patient, suspect GIB . Seen and examined at bedside. Still has sob but improving. Denies chest pain, sob, palpitation.      MEDICATIONS  (STANDING):  amLODIPine   Tablet 10 milliGRAM(s) Oral daily  aspirin enteric coated 81 milliGRAM(s) Oral daily  atorvastatin 80 milliGRAM(s) Oral at bedtime  dextrose 5%. 1000 milliLiter(s) (50 mL/Hr) IV Continuous <Continuous>  dextrose 50% Injectable 12.5 Gram(s) IV Push once  dextrose 50% Injectable 25 Gram(s) IV Push once  dextrose 50% Injectable 25 Gram(s) IV Push once  epoetin brett Injectable 46404 Unit(s) SubCutaneous <User Schedule>  furosemide   Injectable 40 milliGRAM(s) IV Push every 12 hours  hydrALAZINE 50 milliGRAM(s) Oral three times a day  insulin lispro (HumaLOG) corrective regimen sliding scale   SubCutaneous three times a day before meals  insulin lispro (HumaLOG) corrective regimen sliding scale   SubCutaneous at bedtime  iron sucrose Injectable 100 milliGRAM(s) IV Push <User Schedule>  metoprolol tartrate 25 milliGRAM(s) Oral two times a day  pantoprazole    Tablet 40 milliGRAM(s) Oral before breakfast    MEDICATIONS  (PRN):  dextrose 40% Gel 15 Gram(s) Oral once PRN Blood Glucose LESS THAN 70 milliGRAM(s)/deciliter  glucagon  Injectable 1 milliGRAM(s) IntraMuscular once PRN Glucose LESS THAN 70 milligrams/deciliter      Allergies    No Known Allergies    Intolerances        REVIEW OF SYSTEMS:  CONSTITUTIONAL: No fever,  or fatigue  EYES: No eye pain, visual disturbances, or discharge  ENMT:  No difficulty hearing, tinnitus, vertigo; No sinus or throat pain  NECK: No pain or stiffness  RESPIRATORY: No cough, wheezing, chills or hemoptysis; + shortness of breath  CARDIOVASCULAR: No chest pain, palpitations, dizziness  GASTROINTESTINAL: No abdominal or epigastric pain. No nausea, vomiting, or hematemesis; No diarrhea or constipation.  GENITOURINARY: No dysuria, frequency, hematuria, or incontinence  NEUROLOGICAL: No headaches, memory loss, loss of strength, numbness, or tremors  SKIN: No itching, burning, rashes, or lesions   LYMPH NODES: No enlarged glands  ENDOCRINE: No heat or cold intolerance; No hair loss; No polydipsia or polyuria  MUSCULOSKELETAL: No joint pain or swelling; No muscle, back, or extremity pain  HEME/LYMPH: No easy bruising, or bleeding gums  ALLERGY AND IMMUNOLOGIC: No hives or eczema    Vital Signs Last 24 Hrs  T(C): 36.5 (31 Aug 2019 07:47), Max: 37.2 (31 Aug 2019 00:00)  T(F): 97.7 (31 Aug 2019 07:47), Max: 98.9 (31 Aug 2019 00:00)  HR: 58 (31 Aug 2019 07:47) (58 - 77)  BP: 121/59 (31 Aug 2019 07:47) (115/58 - 137/71)  BP(mean): --  RR: 18 (31 Aug 2019 07:47) (17 - 18)  SpO2: 94% (31 Aug 2019 07:47) (94% - 96%)    PHYSICAL EXAM:  GENERAL: NAD, Awake, Alert   HEAD:  Atraumatic, Normocephalic  EYES: EOMI, PERRLA, conjunctiva and sclera clear  ENMT: No tonsillar erythema, exudates, or enlargement; Moist mucous membranes  NECK: Supple, No JVD, Normal thyroid  NERVOUS SYSTEM:  Alert & Oriented X3, Good concentration; Motor Strength 5/5 B/L upper and lower extremities  CHEST/LUNG: Decrease  to auscultation bilaterally; No rales, rhonchi, wheezing, or rubs  HEART: S1S2+,  Regular rate and rhythm  ABDOMEN: Soft, Nontender, Nondistended; Bowel sounds present  EXTREMITIES:  2+ Peripheral Pulses, No clubbing, cyanosis  LYMPH: No lymphadenopathy noted  SKIN: No rashes or lesions    LABS:                        7.6    7.71  )-----------( 266      ( 31 Aug 2019 07:19 )             25.1     31 Aug 2019 07:19    145    |  115    |  64     ----------------------------<  104    5.1     |  19     |  4.30     Ca    8.1        31 Aug 2019 07:19    TPro  5.6    /  Alb  2.3    /  TBili  0.3    /  DBili  x      /  AST  23     /  ALT  29     /  AlkPhos  82     31 Aug 2019 07:19    PT/INR - ( 29 Aug 2019 10:30 )   PT: 12.3 sec;   INR: 1.08 ratio         PTT - ( 29 Aug 2019 10:30 )  PTT:38.2 sec  CAPILLARY BLOOD GLUCOSE      POCT Blood Glucose.: 111 mg/dL (31 Aug 2019 08:04)  POCT Blood Glucose.: 211 mg/dL (30 Aug 2019 21:21)  POCT Blood Glucose.: >600 mg/dL (30 Aug 2019 21:20)  POCT Blood Glucose.: 160 mg/dL (30 Aug 2019 17:05)  POCT Blood Glucose.: 146 mg/dL (30 Aug 2019 11:59)    BLOOD CULTURE  08-29 @ 21:14   <10,000 CFU/mL Normal Urogenital Marla  --  --  08-29 @ 17:08   No growth to date.  --  --    RADIOLOGY & ADDITIONAL TESTS:    Imaging Personally Reviewed:  [ ] YES     Consultant(s) Notes Reviewed:      Care Discussed with Consultants/Other Providers:

## 2019-08-31 NOTE — PROGRESS NOTE ADULT - PROBLEM SELECTOR PLAN 1
-SOB likely 2/2 to acute CHF exacerbation, diastolic dysfunction   -Echo results noted   -Pt BNP 16,687 in ED  -CXR revealed b/l pleural effusion and bibasilar atelectasis.  -Cardio follow up, Dr. Lombardo   -Cardio rec IV 40mg Lasix Q 12, appreciate recs.  -Strict I&O  -Daily weight

## 2019-08-31 NOTE — PROGRESS NOTE ADULT - PROBLEM SELECTOR PLAN 1
multifactorial  reports +fobt as op  hgb trend noted, no evidence of active gib, passing brown stool  monitor cbc, transfuse prn  repeat fobt  cont ppi ppx, venofer, epo  would need cardiac clearance prior to any planned endoscopic w/u, likely to be done as op  will follow

## 2019-08-31 NOTE — PROGRESS NOTE ADULT - ASSESSMENT
1.	CKD 4, CRS  2.	Dyspnea, Fluid overload  3.	Diabetes  4.	Hypertension  5.	Anemia    Rising creatinine trend with mandated diuresis. Will change to PO lasix. Clinically improved.  Monitor blood sugar levels. Insulin coverage as needed. Dietary restriction.   Monitor BP trend. Titrate BP meds as needed. Salt restriction. Monitor h/h trend. Procrit for anemia. PO bicarb. Will follow electrolytes and renal function trend.

## 2019-08-31 NOTE — PROGRESS NOTE ADULT - PROBLEM SELECTOR PLAN 3
-BUN/Cr: 51/3.7, baseline 7/2019 ranges from 3-5.   -Acute on chronic renal failure  -Renal diet, fluid restriction to 2 L  -Monitor BMP, Cr, K in AM  -Nephro consulted, f/u recs  -avoid nephrotoxic meds

## 2019-08-31 NOTE — PROGRESS NOTE ADULT - ASSESSMENT
Pt is a 80 yo F w/ PMH Non-Hodgkins lymphoma, High cholesterol, DM (on insulin),HTN, chronic Renal failure, angioplasty 2009, removal of R kidney and gall bladder 2011, chemo 2011 that presents to [facility] ED c/o SOB being admitted for SOB likely 2/2 to acute CHF exacerbation, bilat pleural effusions,  found to have anemia with recent stool for OB + as out patient, suspect GIB .

## 2019-08-31 NOTE — PROGRESS NOTE ADULT - ASSESSMENT
80 y/o female current smoker with Hx of non-Hodgkin's lymphoma (last chemo in 2009), right kidney Ca s/p nephrectomy (2009), CAD s/p stents to the RCA and LAD x 2 (20019), HTN, DM2, HLD, and CKD stage 4 (s/p left AVG not has been used) presented to the ED c/o worsening SOB, MO, and orthopnea.     Diastolic Heart Failure  -Hypervolemic on exam with Rales and LE edema, with improvement in symptoms   -Please keep accurate Intake and output  -Continue with IV lasix  - Daily standing weights only  - Monitor electrolytes, replete to keep K>4 and Mag>2  -2d echo as above revealing  LVEF 65%, moderate aortic stenosis  1.2 sq cm, mild to moderate MR, moderate TR, LARGE bilateral pleural effusion      CAD  - She has no acute anginal symptoms  - She is s/p stents (RCA, LAD x 2) in 2009  - Continue ASA  - Continue statin  -Mild troponin leak in setting of CKD and hypervolemia     CKD  - Continue to monitor renal indices  - Avoid nephrotoxics  - She had an AV graft that has not been used yet  - Renal following.  Per Renal, no indication for HD right now.    HTN  - Continue Hydralazine 50mg q8H and Norvasc    HLD  - Continue statin    DM2    - FS AC and HS  - Care per Primary    Anemia   Anemia work up as per primary team      DVT ppx  - Per Primary    Padmaja Moore DNP, ANP-c  Cardiology NP  [facility] [phone]

## 2019-08-31 NOTE — PROGRESS NOTE ADULT - PROBLEM SELECTOR PLAN 1
on o2 support  family at the bedside  CHF - valvular heart disease and CKD -   US chest reviewed - small effusions  Cardio and renal following  on LASIX - IV   cont to monitor renal indices  will follow  prognosis guarded  check sat at rest and on exertion

## 2019-08-31 NOTE — PROGRESS NOTE ADULT - PROBLEM SELECTOR PLAN 4
-H/H: 9.2/30.7 upon admission   -F/u repeat h/h  - Will transfuse for Hb <8.0. Fluid overloaded already secondary to acute CHF and on IV lasix   -D/c Lovenox for dvt ppx and put on scd for now   -Will continue baby aspirin for now and monitor h/h and f/u GI and Cardio recommendations   -FOBT positive as out patient, was scheduled for outpatient colonoscopy, did not yet receive test.  -Will check stool for occult blood   -Consult GI, Dr. Garcia called. Will probably need  coloscopy but after cardiac work up complete and cleared, per cardio here will need ischemia work up before that, may be a stress test as out patient. Will defer that to cardio

## 2019-08-31 NOTE — PROGRESS NOTE ADULT - SUBJECTIVE AND OBJECTIVE BOX
Patient is a 79y old  Female who presents with a chief complaint of CHF exaceration, SOB (30 Aug 2019 11:10)  Patient seen in follow up for CKD 4, SOB.     PAST MEDICAL HISTORY:  CAD S/P percutaneous coronary angioplasty  Non-Hodgkins lymphoma  High cholesterol  DM (diabetes mellitus)  HTN (hypertension)  Renal failure    MEDICATIONS  (STANDING):  amLODIPine   Tablet 10 milliGRAM(s) Oral daily  aspirin enteric coated 81 milliGRAM(s) Oral daily  atorvastatin 80 milliGRAM(s) Oral at bedtime  dextrose 5%. 1000 milliLiter(s) (50 mL/Hr) IV Continuous <Continuous>  dextrose 50% Injectable 12.5 Gram(s) IV Push once  dextrose 50% Injectable 25 Gram(s) IV Push once  dextrose 50% Injectable 25 Gram(s) IV Push once  epoetin brett Injectable 54661 Unit(s) SubCutaneous <User Schedule>  furosemide   Injectable 40 milliGRAM(s) IV Push every 12 hours  hydrALAZINE 50 milliGRAM(s) Oral three times a day  insulin lispro (HumaLOG) corrective regimen sliding scale   SubCutaneous three times a day before meals  insulin lispro (HumaLOG) corrective regimen sliding scale   SubCutaneous at bedtime  iron sucrose Injectable 100 milliGRAM(s) IV Push <User Schedule>  metoprolol tartrate 25 milliGRAM(s) Oral two times a day  pantoprazole    Tablet 40 milliGRAM(s) Oral before breakfast    MEDICATIONS  (PRN):  dextrose 40% Gel 15 Gram(s) Oral once PRN Blood Glucose LESS THAN 70 milliGRAM(s)/deciliter  glucagon  Injectable 1 milliGRAM(s) IntraMuscular once PRN Glucose LESS THAN 70 milligrams/deciliter    T(C): 36.7 (08-31-19 @ 11:48), Max: 37.2 (08-31-19 @ 00:00)  HR: 58 (08-31-19 @ 11:48) (58 - 80)  BP: 124/74 (08-31-19 @ 11:48) (115/58 - 164/72)  RR: 18 (08-31-19 @ 11:48)  SpO2: 98% (08-31-19 @ 11:48)  Wt(kg): --  I&O's Detail    30 Aug 2019 07:01  -  31 Aug 2019 07:00  --------------------------------------------------------  IN:    Oral Fluid: 240 mL  Total IN: 240 mL    OUT:    Voided: 350 mL  Total OUT: 350 mL    Total NET: -110 mL            PHYSICAL EXAM:  General: NAD  Respiratory: b/l air entry  Cardiovascular: S1 S2  Gastrointestinal: soft  Extremities:  edema                       LABORATORY:                        7.6    7.71  )-----------( 266      ( 31 Aug 2019 07:19 )             25.1     08-31    145  |  115<H>  |  64<H>  ----------------------------<  104<H>  5.1   |  19<L>  |  4.30<H>    Ca    8.1<L>      31 Aug 2019 07:19  Phos  5.1     08-30    TPro  5.6<L>  /  Alb  2.3<L>  /  TBili  0.3  /  DBili  x   /  AST  23  /  ALT  29  /  AlkPhos  82  08-31    Sodium, Serum: 145 mmol/L (08-31 @ 07:19)  Sodium, Serum: 145 mmol/L (08-30 @ 06:08)    Potassium, Serum: 5.1 mmol/L (08-31 @ 07:19)  Potassium, Serum: 4.8 mmol/L (08-30 @ 06:08)    Hemoglobin: 7.6 g/dL (08-31 @ 07:19)  Hemoglobin: 8.4 g/dL (08-30 @ 10:02)  Hemoglobin: 7.8 g/dL (08-30 @ 06:08)  Hemoglobin: 9.2 g/dL (08-29 @ 10:30)    Creatinine, Serum 4.30 (08-31 @ 07:19)  Creatinine, Serum 4.00 (08-30 @ 06:08)  Creatinine, Serum 3.70 (08-29 @ 10:30)    CARDIAC MARKERS ( 29 Aug 2019 22:50 )  .088 ng/mL / x     / 235 U/L / x     / 7.0 ng/mL  CARDIAC MARKERS ( 29 Aug 2019 16:44 )  .090 ng/mL / x     / 256 U/L / x     / 7.3 ng/mL      LIVER FUNCTIONS - ( 31 Aug 2019 07:19 )  Alb: 2.3 g/dL / Pro: 5.6 g/dL / ALK PHOS: 82 U/L / ALT: 29 U/L / AST: 23 U/L / GGT: x

## 2019-08-31 NOTE — PROGRESS NOTE ADULT - PROBLEM SELECTOR PLAN 5
-Consistent carb diet, renal and dash.   -hypoglycemic protocol in place  -sliding scale insulin  - Will check HbA1C

## 2019-08-31 NOTE — PROGRESS NOTE ADULT - SUBJECTIVE AND OBJECTIVE BOX
Date/Time Patient Seen:  		  Referring MD:   Data Reviewed	       Patient is a 79y old  Female who presents with a chief complaint of CHF exaceration, SOB (30 Aug 2019 16:13)      Subjective/HPI     PAST MEDICAL & SURGICAL HISTORY:  CAD S/P percutaneous coronary angioplasty  Non-Hodgkins lymphoma  High cholesterol  DM (diabetes mellitus)  HTN (hypertension)  Renal failure  History of cholecystectomy  History of right nephrectomy        Medication list         MEDICATIONS  (STANDING):  amLODIPine   Tablet 10 milliGRAM(s) Oral daily  aspirin enteric coated 81 milliGRAM(s) Oral daily  atorvastatin 80 milliGRAM(s) Oral at bedtime  dextrose 5%. 1000 milliLiter(s) (50 mL/Hr) IV Continuous <Continuous>  dextrose 50% Injectable 12.5 Gram(s) IV Push once  dextrose 50% Injectable 25 Gram(s) IV Push once  dextrose 50% Injectable 25 Gram(s) IV Push once  epoetin brett Injectable 32848 Unit(s) SubCutaneous <User Schedule>  furosemide   Injectable 40 milliGRAM(s) IV Push every 12 hours  hydrALAZINE 50 milliGRAM(s) Oral three times a day  insulin lispro (HumaLOG) corrective regimen sliding scale   SubCutaneous three times a day before meals  insulin lispro (HumaLOG) corrective regimen sliding scale   SubCutaneous at bedtime  iron sucrose Injectable 100 milliGRAM(s) IV Push <User Schedule>  metoprolol tartrate 25 milliGRAM(s) Oral two times a day  pantoprazole    Tablet 40 milliGRAM(s) Oral before breakfast    MEDICATIONS  (PRN):  dextrose 40% Gel 15 Gram(s) Oral once PRN Blood Glucose LESS THAN 70 milliGRAM(s)/deciliter  glucagon  Injectable 1 milliGRAM(s) IntraMuscular once PRN Glucose LESS THAN 70 milligrams/deciliter         Vitals log        ICU Vital Signs Last 24 Hrs  T(C): 36.5 (31 Aug 2019 04:35), Max: 37.2 (31 Aug 2019 00:00)  T(F): 97.7 (31 Aug 2019 04:35), Max: 98.9 (31 Aug 2019 00:00)  HR: 65 (31 Aug 2019 06:08) (58 - 77)  BP: 124/61 (31 Aug 2019 06:08) (115/58 - 137/71)  BP(mean): --  ABP: --  ABP(mean): --  RR: 18 (31 Aug 2019 04:35) (17 - 18)  SpO2: 95% (31 Aug 2019 04:35) (95% - 96%)           Input and Output:  I&O's Detail    29 Aug 2019 07:01  -  30 Aug 2019 07:00  --------------------------------------------------------  IN:    Oral Fluid: 240 mL  Total IN: 240 mL    OUT:  Total OUT: 0 mL    Total NET: 240 mL      30 Aug 2019 07:01  -  31 Aug 2019 06:39  --------------------------------------------------------  IN:    Oral Fluid: 240 mL  Total IN: 240 mL    OUT:    Voided: 350 mL  Total OUT: 350 mL    Total NET: -110 mL          Lab Data                        8.4    x     )-----------( x        ( 30 Aug 2019 10:02 )             27.3     08-30    145  |  116<H>  |  59<H>  ----------------------------<  113<H>  4.8   |  19<L>  |  4.00<H>    Ca    8.4<L>      30 Aug 2019 06:08  Phos  5.1     08-30    TPro  6.1  /  Alb  2.4<L>  /  TBili  0.3  /  DBili  x   /  AST  27  /  ALT  29  /  AlkPhos  91  08-30      CARDIAC MARKERS ( 29 Aug 2019 22:50 )  .088 ng/mL / x     / 235 U/L / x     / 7.0 ng/mL  CARDIAC MARKERS ( 29 Aug 2019 16:44 )  .090 ng/mL / x     / 256 U/L / x     / 7.3 ng/mL  CARDIAC MARKERS ( 29 Aug 2019 10:30 )  .084 ng/mL / x     / x     / x     / x            Review of Systems	      Objective     Physical Examination    heart s1s2  lung dec BS  abd soft      Pertinent Lab findings & Imaging      Miguel Ángel:  NO   Adequate UO     I&O's Detail    29 Aug 2019 07:01  -  30 Aug 2019 07:00  --------------------------------------------------------  IN:    Oral Fluid: 240 mL  Total IN: 240 mL    OUT:  Total OUT: 0 mL    Total NET: 240 mL      30 Aug 2019 07:01  -  31 Aug 2019 06:39  --------------------------------------------------------  IN:    Oral Fluid: 240 mL  Total IN: 240 mL    OUT:    Voided: 350 mL  Total OUT: 350 mL    Total NET: -110 mL               Discussed with:     Cultures:	        Radiology

## 2019-08-31 NOTE — PROGRESS NOTE ADULT - SUBJECTIVE AND OBJECTIVE BOX
INTERVAL HPI/OVERNIGHT EVENTS:  pt seen and examined  denies n/v/abd pain  reports brown bm yesterday  no s/s active gib per nursing    MEDICATIONS  (STANDING):  amLODIPine   Tablet 10 milliGRAM(s) Oral daily  aspirin enteric coated 81 milliGRAM(s) Oral daily  atorvastatin 80 milliGRAM(s) Oral at bedtime  dextrose 5%. 1000 milliLiter(s) (50 mL/Hr) IV Continuous <Continuous>  dextrose 50% Injectable 12.5 Gram(s) IV Push once  dextrose 50% Injectable 25 Gram(s) IV Push once  dextrose 50% Injectable 25 Gram(s) IV Push once  epoetin brett Injectable 20620 Unit(s) SubCutaneous <User Schedule>  furosemide   Injectable 40 milliGRAM(s) IV Push every 12 hours  hydrALAZINE 50 milliGRAM(s) Oral three times a day  insulin lispro (HumaLOG) corrective regimen sliding scale   SubCutaneous three times a day before meals  insulin lispro (HumaLOG) corrective regimen sliding scale   SubCutaneous at bedtime  iron sucrose Injectable 100 milliGRAM(s) IV Push <User Schedule>  metoprolol tartrate 25 milliGRAM(s) Oral two times a day  pantoprazole    Tablet 40 milliGRAM(s) Oral before breakfast    MEDICATIONS  (PRN):  dextrose 40% Gel 15 Gram(s) Oral once PRN Blood Glucose LESS THAN 70 milliGRAM(s)/deciliter  glucagon  Injectable 1 milliGRAM(s) IntraMuscular once PRN Glucose LESS THAN 70 milligrams/deciliter      Allergies    No Known Allergies    Intolerances        Review of Systems:    General:  No wt loss, fevers, chills, night sweats, fatigue   Eyes:  Good vision, no reported pain  ENT:  No sore throat, pain, runny nose, dysphagia  CV:  No pain, palpitations, hypo/hypertension  Resp:  No dyspnea, cough, tachypnea, wheezing  GI:  No pain, No nausea, No vomiting, No diarrhea, No constipation, No weight loss, No fever, No pruritis, No rectal bleeding, No melena, No dysphagia  :  No pain, bleeding, incontinence, nocturia  Muscle:  No pain, weakness  Neuro:  No weakness, tingling, memory problems  Psych:  No fatigue, insomnia, mood problems, depression  Endocrine:  No polyuria, polydypsia, cold/heat intolerance  Heme:  No petechiae, ecchymosis, easy bruisability  Skin:  No rash, tattoos, scars, edema      Vital Signs Last 24 Hrs  T(C): 36.5 (31 Aug 2019 07:47), Max: 37.2 (31 Aug 2019 00:00)  T(F): 97.7 (31 Aug 2019 07:47), Max: 98.9 (31 Aug 2019 00:00)  HR: 58 (31 Aug 2019 07:47) (58 - 77)  BP: 121/59 (31 Aug 2019 07:47) (115/58 - 137/71)  BP(mean): --  RR: 18 (31 Aug 2019 07:47) (17 - 18)  SpO2: 94% (31 Aug 2019 07:47) (94% - 96%)    PHYSICAL EXAM:    General:  nad  HEENT:  NC/AT  Chest:  dec bs  Cardiovascular:  Regular rhythm, S1, S2  Abdomen:  Soft, non-tender, mild dt  Extremities:  no  edema  Skin:  No rash  Neuro/Psych:  A&Ox3        LABS:                        7.6    7.71  )-----------( 266      ( 31 Aug 2019 07:19 )             25.1     08-31    145  |  115<H>  |  64<H>  ----------------------------<  104<H>  5.1   |  19<L>  |  4.30<H>    Ca    8.1<L>      31 Aug 2019 07:19  Phos  5.1     08-30    TPro  5.6<L>  /  Alb  2.3<L>  /  TBili  0.3  /  DBili  x   /  AST  23  /  ALT  29  /  AlkPhos  82  08-31    PT/INR - ( 29 Aug 2019 10:30 )   PT: 12.3 sec;   INR: 1.08 ratio         PTT - ( 29 Aug 2019 10:30 )  PTT:38.2 sec  Urinalysis Basic - ( 29 Aug 2019 15:13 )    Color: Pale Yellow / Appearance: Clear / S.005 / pH: x  Gluc: x / Ketone: Negative  / Bili: Negative / Urobili: Negative   Blood: x / Protein: 500 mg/dL / Nitrite: Negative   Leuk Esterase: Negative / RBC: 0-2 /HPF / WBC 0-2   Sq Epi: x / Non Sq Epi: Occasional / Bacteria: Negative        RADIOLOGY & ADDITIONAL TESTS:

## 2019-08-31 NOTE — PROGRESS NOTE ADULT - PROBLEM SELECTOR PLAN 8
-continue home dose asa  -do not restart Clopidogrel as per cardio  -trop was 0.084, trending cardiac enzymes Q6 x2, f/u results.   -repeat EKG AM

## 2019-08-31 NOTE — PROGRESS NOTE ADULT - PROBLEM SELECTOR PLAN 2
Noted on TTE , bilat large pleural effusions  US shows not much fluid though  Pulm following  Continue IV Nacho Daniel to see patient

## 2019-08-31 NOTE — PROGRESS NOTE ADULT - SUBJECTIVE AND OBJECTIVE BOX
[facility] -- Flaca Summers, Grupo, Missael, Adan Heredia Savella  Office # [phone]    Follow Up:  CHF Exacerbation/ SOB       HPI:  Pt is a 78 yo F w/ PMH Non-Hodgkins lymphoma, High cholesterol, DM (on insulin),HTN, chronic Renal failure, angioplasty 2009, removal of R kidney and gall bladder 2011, chemo 2011 that presents to [facility] ED c/o SOB for the past 3-4 days. She explains that she has been traveling recently. She just got back from [place]. Her shortness of breath began during her trip. She explains she has never had this before. She was having trouble walking long distances before becoming out of breath and needing to stop walking. She rates the SOB 7/10. She also admits to leg swelling, LE spasms/pain, orthopnea and palpations. She recently had a positive FOBT outpatient and was scheduled for a colonoscopy but has yet to receive it. She denies abdominal pain, chest pain, dyspnea, numbness, tingling, syncope, dizziness.     In ED /60, HR 88, SPO2 98 on room air. No leukocytosis, afebrile.   BUN 51, Cr 3.7, Lactate 1.3, BNP 16,687, trop .084, H/H 9.2/30.7  EKG: Sinus rhythm, PVCs, PACs. LVH. LA enlargement.   CXR: small b/l pleural effusions and bibasilar atelectasis. (29 Aug 2019 14:55)      Subjective/Observations:     Pt. seen examined and evaluated. Pt. resting comfortably in bed in NAD, with no respiratory distress, no chest pain, dyspnea, palpitations, PND or orthopnea     REVIEW OF SYSTEMS: All other review of systems is negative unless indicated above    PAST MEDICAL & SURGICAL HISTORY:  CAD S/P percutaneous coronary angioplasty  Non-Hodgkins lymphoma  High cholesterol  DM (diabetes mellitus)  HTN (hypertension)  Renal failure  History of cholecystectomy  History of right nephrectomy      MEDICATIONS  (STANDING):  amLODIPine   Tablet 10 milliGRAM(s) Oral daily  aspirin enteric coated 81 milliGRAM(s) Oral daily  atorvastatin 80 milliGRAM(s) Oral at bedtime  dextrose 5%. 1000 milliLiter(s) (50 mL/Hr) IV Continuous <Continuous>  dextrose 50% Injectable 12.5 Gram(s) IV Push once  dextrose 50% Injectable 25 Gram(s) IV Push once  dextrose 50% Injectable 25 Gram(s) IV Push once  epoetin brett Injectable 19815 Unit(s) SubCutaneous <User Schedule>  furosemide   Injectable 40 milliGRAM(s) IV Push every 12 hours  hydrALAZINE 50 milliGRAM(s) Oral three times a day  insulin lispro (HumaLOG) corrective regimen sliding scale   SubCutaneous three times a day before meals  insulin lispro (HumaLOG) corrective regimen sliding scale   SubCutaneous at bedtime  iron sucrose Injectable 100 milliGRAM(s) IV Push <User Schedule>  metoprolol tartrate 25 milliGRAM(s) Oral two times a day  pantoprazole    Tablet 40 milliGRAM(s) Oral before breakfast    MEDICATIONS  (PRN):  dextrose 40% Gel 15 Gram(s) Oral once PRN Blood Glucose LESS THAN 70 milliGRAM(s)/deciliter  glucagon  Injectable 1 milliGRAM(s) IntraMuscular once PRN Glucose LESS THAN 70 milligrams/deciliter      Allergies: No Known Allergies      Vital Signs Last 24 Hrs  T(C): 36.5 (31 Aug 2019 04:35), Max: 37.2 (31 Aug 2019 00:00)  T(F): 97.7 (31 Aug 2019 04:35), Max: 98.9 (31 Aug 2019 00:00)  HR: 65 (31 Aug 2019 06:08) (58 - 77)  BP: 124/61 (31 Aug 2019 06:08) (115/58 - 137/71)  BP(mean): --  RR: 18 (31 Aug 2019 04:35) (17 - 18)  SpO2: 95% (31 Aug 2019 04:35) (95% - 96%)    I&O's Summary    30 Aug 2019 07:01  -  31 Aug 2019 07:00  --------------------------------------------------------  IN: 240 mL / OUT: 350 mL / NET: -110 mL          PHYSICAL EXAM:    Constitutional: NAD, awake and alert, well-developed  HEENT: Moist Mucous Membranes, Anicteric  Pulmonary: Non-labored, breath sounds are clear bilaterally, No wheezing, rales or rhonchi  Cardiovascular: Regular, S1 and S2, No murmurs, rubs, gallops or clicks  Gastrointestinal: Bowel Sounds present, soft, nontender.   Lymph: No peripheral edema. No lymphadenopathy.  Skin: No visible rashes or ulcers.  Psych:  Mood & affect appropriate    LABS: All Labs Reviewed:                  Interpretation of Telemetry:       ECHO:     < from: TTE Echo Doppler w/o Cont (08.29.19 @ 16:00) >  OBSERVATIONS:    Mitral Valve: normal, mild to moderate MR.  Aortic Valve/Aorta: Calcified trileaflet aortic valve with decreased   opening. Aortic valve area is calculated to be 1.2 sq cm, consistent with   moderate aortic stenosis  Tricuspid Valve: normal with moderate TR.  Pulmonic Valve: normal  Left Atrium: Enlarged  Right Atrium: Enlarged  Left Ventricle: normal LV size and systolic function, estimated LVEF of   65%.  Right Ventricle: Grossly normal size and systolic function.  Pericardium/Pleura: normal, no significant pericardial effusion.  Pulmonary/RV Pressure: estimated PA systolic pressure of 52 mmHg   LV diastolic dysfunction is present  Large bilateral pleural effusions    Conclusion:     Normal left ventricular internal dimensions and systolic function,   estimated LVEF of 65%.   Grossly normal RV size and systolic function.   Biatrial enlargement  Calcified trileaflet aortic valve with decreased opening. Aortic valve   area is calculated to be 1.2 sq cm, consistent with moderate aortic   stenosis  Mild to moderate MR   Moderate TR.    Estimated PA systolic pressure of 52 mmHg.    No significant pericardial effusion.  Large bilateral pleural effusions [facility] -- Flaca Summers, Grupo, Missael, Adan Heredia Savella  Office # [phone]    Follow Up:  CHF Exacerbation/ SOB       HPI:  Pt is a 78 yo F w/ PMH Non-Hodgkins lymphoma, High cholesterol, DM (on insulin),HTN, chronic Renal failure, angioplasty 2009, removal of R kidney and gall bladder 2011, chemo 2011 that presents to [facility] ED c/o SOB for the past 3-4 days. She explains that she has been traveling recently. She just got back from [place]. Her shortness of breath began during her trip. She explains she has never had this before. She was having trouble walking long distances before becoming out of breath and needing to stop walking. She rates the SOB 7/10. She also admits to leg swelling, LE spasms/pain, orthopnea and palpations. She recently had a positive FOBT outpatient and was scheduled for a colonoscopy but has yet to receive it. She denies abdominal pain, chest pain, dyspnea, numbness, tingling, syncope, dizziness.     In ED /60, HR 88, SPO2 98 on room air. No leukocytosis, afebrile.   BUN 51, Cr 3.7, Lactate 1.3, BNP 16,687, trop .084, H/H 9.2/30.7  EKG: Sinus rhythm, PVCs, PACs. LVH. LA enlargement.   CXR: small b/l pleural effusions and bibasilar atelectasis. (29 Aug 2019 14:55)      Subjective/Observations:     Pt. seen examined and evaluated. Pt. resting comfortably in bed in NAD, with no respiratory distress, no chest pain, dyspnea, palpitations, PND or orthopnea     REVIEW OF SYSTEMS: All other review of systems is negative unless indicated above    PAST MEDICAL & SURGICAL HISTORY:  CAD S/P percutaneous coronary angioplasty  Non-Hodgkins lymphoma  High cholesterol  DM (diabetes mellitus)  HTN (hypertension)  Renal failure  History of cholecystectomy  History of right nephrectomy      MEDICATIONS  (STANDING):  amLODIPine   Tablet 10 milliGRAM(s) Oral daily  aspirin enteric coated 81 milliGRAM(s) Oral daily  atorvastatin 80 milliGRAM(s) Oral at bedtime  dextrose 5%. 1000 milliLiter(s) (50 mL/Hr) IV Continuous <Continuous>  dextrose 50% Injectable 12.5 Gram(s) IV Push once  dextrose 50% Injectable 25 Gram(s) IV Push once  dextrose 50% Injectable 25 Gram(s) IV Push once  epoetin brett Injectable 03476 Unit(s) SubCutaneous <User Schedule>  furosemide   Injectable 40 milliGRAM(s) IV Push every 12 hours  hydrALAZINE 50 milliGRAM(s) Oral three times a day  insulin lispro (HumaLOG) corrective regimen sliding scale   SubCutaneous three times a day before meals  insulin lispro (HumaLOG) corrective regimen sliding scale   SubCutaneous at bedtime  iron sucrose Injectable 100 milliGRAM(s) IV Push <User Schedule>  metoprolol tartrate 25 milliGRAM(s) Oral two times a day  pantoprazole    Tablet 40 milliGRAM(s) Oral before breakfast    MEDICATIONS  (PRN):  dextrose 40% Gel 15 Gram(s) Oral once PRN Blood Glucose LESS THAN 70 milliGRAM(s)/deciliter  glucagon  Injectable 1 milliGRAM(s) IntraMuscular once PRN Glucose LESS THAN 70 milligrams/deciliter      Allergies: No Known Allergies      Vital Signs Last 24 Hrs  T(C): 36.5 (31 Aug 2019 04:35), Max: 37.2 (31 Aug 2019 00:00)  T(F): 97.7 (31 Aug 2019 04:35), Max: 98.9 (31 Aug 2019 00:00)  HR: 65 (31 Aug 2019 06:08) (58 - 77)  BP: 124/61 (31 Aug 2019 06:08) (115/58 - 137/71)  BP(mean): --  RR: 18 (31 Aug 2019 04:35) (17 - 18)  SpO2: 95% (31 Aug 2019 04:35) (95% - 96%)    I&O's Summary    30 Aug 2019 07:01  -  31 Aug 2019 07:00  --------------------------------------------------------  IN: 240 mL / OUT: 350 mL / NET: -110 mL          PHYSICAL EXAM:    Constitutional: NAD, awake and alert, well-developed  HEENT: Moist Mucous Membranes, Anicteric  Pulmonary: Non-labored, breath sounds are clear bilaterally, No wheezing, rales or rhonchi  Cardiovascular: Regular, S1 and S2, No murmurs, rubs, gallops or clicks  Gastrointestinal: Bowel Sounds present, soft, nontender.   Lymph: No peripheral edema. No lymphadenopathy.  Skin: No visible rashes or ulcers.  Psych:  Mood & affect appropriate    LABS: All Labs Reviewed:                         7.6    7.71  )-----------( 266      ( 31 Aug 2019 07:19 )             25.1   08-31    145  |  115<H>  |  64<H>  ----------------------------<  104<H>  5.1   |  19<L>  |  4.30<H>    Ca    8.1<L>      31 Aug 2019 07:19  Phos  5.1     08-30    TPro  5.6<L>  /  Alb  2.3<L>  /  TBili  0.3  /  DBili  x   /  AST  23  /  ALT  29  /  AlkPhos  82  08-31                 Interpretation of Telemetry: Overnight on telemetry SB 58's        ECHO:     < from: TTE Echo Doppler w/o Cont (08.29.19 @ 16:00) >  OBSERVATIONS:    Mitral Valve: normal, mild to moderate MR.  Aortic Valve/Aorta: Calcified trileaflet aortic valve with decreased   opening. Aortic valve area is calculated to be 1.2 sq cm, consistent with   moderate aortic stenosis  Tricuspid Valve: normal with moderate TR.  Pulmonic Valve: normal  Left Atrium: Enlarged  Right Atrium: Enlarged  Left Ventricle: normal LV size and systolic function, estimated LVEF of   65%.  Right Ventricle: Grossly normal size and systolic function.  Pericardium/Pleura: normal, no significant pericardial effusion.  Pulmonary/RV Pressure: estimated PA systolic pressure of 52 mmHg   LV diastolic dysfunction is present  Large bilateral pleural effusions    Conclusion:     Normal left ventricular internal dimensions and systolic function,   estimated LVEF of 65%.   Grossly normal RV size and systolic function.   Biatrial enlargement  Calcified trileaflet aortic valve with decreased opening. Aortic valve   area is calculated to be 1.2 sq cm, consistent with moderate aortic   stenosis  Mild to moderate MR   Moderate TR.    Estimated PA systolic pressure of 52 mmHg.    No significant pericardial effusion.  Large bilateral pleural effusions

## 2019-09-01 LAB
ANION GAP SERPL CALC-SCNC: 12 MMOL/L — SIGNIFICANT CHANGE UP (ref 5–17)
BUN SERPL-MCNC: 75 MG/DL — HIGH (ref 7–23)
CALCIUM SERPL-MCNC: 8.4 MG/DL — LOW (ref 8.5–10.1)
CHLORIDE SERPL-SCNC: 113 MMOL/L — HIGH (ref 96–108)
CO2 SERPL-SCNC: 19 MMOL/L — LOW (ref 22–31)
CREAT SERPL-MCNC: 4.7 MG/DL — HIGH (ref 0.5–1.3)
GLUCOSE SERPL-MCNC: 119 MG/DL — HIGH (ref 70–99)
HCT VFR BLD CALC: 26.5 % — LOW (ref 34.5–45)
HGB BLD-MCNC: 7.9 G/DL — LOW (ref 11.5–15.5)
MCHC RBC-ENTMCNC: 27.1 PG — SIGNIFICANT CHANGE UP (ref 27–34)
MCHC RBC-ENTMCNC: 29.8 GM/DL — LOW (ref 32–36)
MCV RBC AUTO: 91.1 FL — SIGNIFICANT CHANGE UP (ref 80–100)
NRBC # BLD: 0 /100 WBCS — SIGNIFICANT CHANGE UP (ref 0–0)
PLATELET # BLD AUTO: 300 K/UL — SIGNIFICANT CHANGE UP (ref 150–400)
POTASSIUM SERPL-MCNC: 5.1 MMOL/L — SIGNIFICANT CHANGE UP (ref 3.5–5.3)
POTASSIUM SERPL-SCNC: 5.1 MMOL/L — SIGNIFICANT CHANGE UP (ref 3.5–5.3)
RBC # BLD: 2.91 M/UL — LOW (ref 3.8–5.2)
RBC # FLD: 18.5 % — HIGH (ref 10.3–14.5)
SODIUM SERPL-SCNC: 144 MMOL/L — SIGNIFICANT CHANGE UP (ref 135–145)
WBC # BLD: 8.32 K/UL — SIGNIFICANT CHANGE UP (ref 3.8–10.5)
WBC # FLD AUTO: 8.32 K/UL — SIGNIFICANT CHANGE UP (ref 3.8–10.5)

## 2019-09-01 PROCEDURE — 99233 SBSQ HOSP IP/OBS HIGH 50: CPT

## 2019-09-01 PROCEDURE — 99232 SBSQ HOSP IP/OBS MODERATE 35: CPT

## 2019-09-01 RX ORDER — SODIUM BICARBONATE 1 MEQ/ML
650 SYRINGE (ML) INTRAVENOUS
Refills: 0 | Status: DISCONTINUED | OUTPATIENT
Start: 2019-09-01 | End: 2019-09-03

## 2019-09-01 RX ORDER — CHLORHEXIDINE GLUCONATE 213 G/1000ML
1 SOLUTION TOPICAL DAILY
Refills: 0 | Status: DISCONTINUED | OUTPATIENT
Start: 2019-09-01 | End: 2019-09-03

## 2019-09-01 RX ORDER — ACETAMINOPHEN 500 MG
650 TABLET ORAL ONCE
Refills: 0 | Status: COMPLETED | OUTPATIENT
Start: 2019-09-01 | End: 2019-09-01

## 2019-09-01 RX ADMIN — PANTOPRAZOLE SODIUM 40 MILLIGRAM(S): 20 TABLET, DELAYED RELEASE ORAL at 05:53

## 2019-09-01 RX ADMIN — Medication 50 MILLIGRAM(S): at 22:33

## 2019-09-01 RX ADMIN — AMLODIPINE BESYLATE 10 MILLIGRAM(S): 2.5 TABLET ORAL at 05:52

## 2019-09-01 RX ADMIN — Medication 81 MILLIGRAM(S): at 11:35

## 2019-09-01 RX ADMIN — Medication 50 MILLIGRAM(S): at 13:50

## 2019-09-01 RX ADMIN — Medication 25 MILLIGRAM(S): at 05:52

## 2019-09-01 RX ADMIN — Medication 50 MILLIGRAM(S): at 05:52

## 2019-09-01 RX ADMIN — Medication 650 MILLIGRAM(S): at 18:15

## 2019-09-01 RX ADMIN — IRON SUCROSE 100 MILLIGRAM(S): 20 INJECTION, SOLUTION INTRAVENOUS at 18:15

## 2019-09-01 RX ADMIN — CHLORHEXIDINE GLUCONATE 1 APPLICATION(S): 213 SOLUTION TOPICAL at 06:46

## 2019-09-01 RX ADMIN — Medication 650 MILLIGRAM(S): at 18:56

## 2019-09-01 RX ADMIN — Medication 40 MILLIGRAM(S): at 05:52

## 2019-09-01 RX ADMIN — Medication 25 MILLIGRAM(S): at 18:14

## 2019-09-01 RX ADMIN — ATORVASTATIN CALCIUM 80 MILLIGRAM(S): 80 TABLET, FILM COATED ORAL at 22:33

## 2019-09-01 RX ADMIN — Medication 2: at 12:50

## 2019-09-01 NOTE — PROGRESS NOTE ADULT - PROBLEM SELECTOR PLAN 5
-Consistent carb diet, renal and dash.   -hypoglycemic protocol in place  -sliding scale insulin  - Will check HbA1C -type 2  -Consistent carb diet, renal and dash.   -hypoglycemic protocol in place  -sliding scale insulin  - A1c-6.6

## 2019-09-01 NOTE — PROGRESS NOTE ADULT - ASSESSMENT
Pt is a 80 yo F w/ PMH Non-Hodgkins lymphoma, High cholesterol, DM (on insulin),HTN, chronic Renal failure, angioplasty 2009, removal of R kidney and gall bladder 2011, chemo 2011 that presents to [facility] ED c/o SOB being admitted for SOB likely 2/2 to acute diastolic CHF exacerbation, bilat pleural effusions,  found to have anemia with recent stool for OB + as out patient, suspect GIB .

## 2019-09-01 NOTE — PROGRESS NOTE ADULT - PROBLEM SELECTOR PLAN 1
multifactorial  fobt+  hgb trend noted, no evidence of active gib  am labs pending  monitor cbc, transfuse prn  cont ppi ppx, venofer, epo  will need further gi w/u as op once cleared by cardiology  will follow

## 2019-09-01 NOTE — PROGRESS NOTE ADULT - ASSESSMENT
78 yo F w/ PMH Non-Hodgkins lymphoma, High cholesterol, DM (on insulin),HTN, chronic Renal failure, angioplasty 2009, removal of R kidney and gall bladder 2011, chemo 2011 that presents to [facility] ED c/o SOB for the past 3-4 days. pt w + op fobt test, was planned for colonoscopy, gi consulted

## 2019-09-01 NOTE — PROGRESS NOTE ADULT - ASSESSMENT
80 y/o female current smoker with Hx of non-Hodgkin's lymphoma (last chemo in 2009), right kidney Ca s/p nephrectomy (2009), CAD s/p stents to the RCA and LAD x 2 (20019), HTN, DM2, HLD, and CKD stage 4 (s/p left AVG not has been used) presented to the ED c/o worsening SOB, MO, and orthopnea.     Diastolic Heart Failure  -Hypervolemic on exam with Rales and LE edema, with improvement in symptoms   -Please keep accurate Intake and output  -Continue with IV lasix  - Daily standing weights only  - Monitor electrolytes, replete to keep K>4 and Mag>2  -2d echo as above revealing  LVEF 65%, moderate aortic stenosis  1.2 sq cm, mild to moderate MR, moderate TR, LARGE bilateral pleural effusion      CAD  - She has no acute anginal symptoms  - She is s/p stents (RCA, LAD x 2) in 2009  - Continue ASA  - Continue statin  - Mild troponin leak in setting of CKD and hypervolemia   - Outpatient ischemic workup      CKD  - Continue to monitor renal indices  - Avoid nephrotoxics  - She had an AV graft that has not been used yet  - Renal following.  Per Renal, no indication for HD right now.    HTN  - Continue Hydralazine 50mg q8H and Norvasc    HLD  - Continue statin    DM2    - FS AC and HS  - Care per Primary    Anemia   Anemia work up as per primary team      DVT ppx  - Per Primary    Padmaja Moore DNP, ANP-c  Cardiology NP  [id number] [phone] 78 y/o female current smoker with Hx of non-Hodgkin's lymphoma (last chemo in 2009), right kidney Ca s/p nephrectomy (2009), CAD s/p stents to the RCA and LAD x 2 (20019), HTN, DM2, HLD, and CKD stage 4 (s/p left AVG not has been used) presented to the ED c/o worsening SOB, MO, and orthopnea.     Diastolic Heart Failure  -Hypervolemic on exam with Rales and LE edema, with improvement in symptoms   -Please keep accurate Intake and output  - Lasix changed to PO 40mg daily   - Daily standing weights only  - Monitor electrolytes, replete to keep K>4 and Mag>2  -2d echo as above revealing  LVEF 65%, moderate aortic stenosis  1.2 sq cm, mild to moderate MR, moderate TR, LARGE bilateral pleural effusion      CAD  - She has no acute anginal symptoms  - She is s/p stents (RCA, LAD x 2) in 2009  - Continue ASA  - Continue statin  - Mild troponin leak in setting of CKD and hypervolemia   - Outpatient ischemic workup      CKD  - Continue to monitor renal indices  - Avoid nephrotoxics  - She had an AV graft that has not been used yet  - Renal following.  Per Renal, no indication for HD right now.    HTN  - Continue Hydralazine 50mg q8H and Norvasc    HLD  - Continue statin    DM2    - FS AC and HS  - Care per Primary    Anemia   Anemia work up as per primary team      DVT ppx  - Per Primary    Padmaja Moore DNP, ANP-c  Cardiology NP  [id number] [phone]

## 2019-09-01 NOTE — PROGRESS NOTE ADULT - PROBLEM SELECTOR PLAN 1
-SOB likely 2/2 to acute CHF exacerbation, diastolic dysfunction   -Echo results noted   -Pt BNP 16,687 in ED  -CXR revealed b/l pleural effusion and bibasilar atelectasis.  -Cardio follow up, Dr. Lombardo   -Cardio rec IV 40mg Lasix Q 12, appreciate recs.  -Strict I&O  -Daily weight -SOB likely 2/2 to acute CHF exacerbation, diastolic dysfunction   -Echo results noted   -Pt BNP 16,687 in ED  -CXR revealed b/l pleural effusion and bibasilar atelectasis.  -Cardio follow up, Dr. Lombardo   -Cardio recs now on po lasix 40mg daily  -Strict I&O  -Daily weight

## 2019-09-01 NOTE — PROGRESS NOTE ADULT - ASSESSMENT
1.	CKD 4, CRS  2.	Dyspnea, Fluid overload  3.	Diabetes  4.	Hypertension  5.	Anemia    Rising creatinine trend. On PO lasix now. Hold d/c until renal function stable. Clinically improved.    Monitor blood sugar levels. Insulin coverage as needed. Dietary restriction.   Monitor BP trend. Titrate BP meds as needed. Salt restriction. Monitor h/h trend. Procrit for anemia. PO bicarb.   Will follow electrolytes and renal function trend.

## 2019-09-01 NOTE — PROGRESS NOTE ADULT - SUBJECTIVE AND OBJECTIVE BOX
INTERVAL HPI/OVERNIGHT EVENTS:  pt seen and examined  denies n/v/abd pain  bm yesterday  no s/s active gib   labs pending    MEDICATIONS  (STANDING):  amLODIPine   Tablet 10 milliGRAM(s) Oral daily  aspirin enteric coated 81 milliGRAM(s) Oral daily  atorvastatin 80 milliGRAM(s) Oral at bedtime  chlorhexidine 2% Cloths 1 Application(s) Topical daily  dextrose 5%. 1000 milliLiter(s) (50 mL/Hr) IV Continuous <Continuous>  dextrose 50% Injectable 12.5 Gram(s) IV Push once  dextrose 50% Injectable 25 Gram(s) IV Push once  dextrose 50% Injectable 25 Gram(s) IV Push once  epoetin brett Injectable 62941 Unit(s) SubCutaneous <User Schedule>  furosemide    Tablet 40 milliGRAM(s) Oral daily  hydrALAZINE 50 milliGRAM(s) Oral three times a day  insulin lispro (HumaLOG) corrective regimen sliding scale   SubCutaneous three times a day before meals  insulin lispro (HumaLOG) corrective regimen sliding scale   SubCutaneous at bedtime  iron sucrose Injectable 100 milliGRAM(s) IV Push <User Schedule>  metoprolol tartrate 25 milliGRAM(s) Oral two times a day  pantoprazole    Tablet 40 milliGRAM(s) Oral before breakfast    MEDICATIONS  (PRN):  dextrose 40% Gel 15 Gram(s) Oral once PRN Blood Glucose LESS THAN 70 milliGRAM(s)/deciliter  glucagon  Injectable 1 milliGRAM(s) IntraMuscular once PRN Glucose LESS THAN 70 milligrams/deciliter      Allergies    No Known Allergies    Intolerances        Review of Systems:    General:  No wt loss, fevers, chills, night sweats, fatigue   Eyes:  Good vision, no reported pain  ENT:  No sore throat, pain, runny nose, dysphagia  CV:  No pain, palpitations, hypo/hypertension  Resp:  No dyspnea, cough, tachypnea, wheezing  GI:  No pain, No nausea, No vomiting, No diarrhea, No constipation, No weight loss, No fever, No pruritis, No rectal bleeding, No melena, No dysphagia  :  No pain, bleeding, incontinence, nocturia  Muscle:  No pain, weakness  Neuro:  No weakness, tingling, memory problems  Psych:  No fatigue, insomnia, mood problems, depression  Endocrine:  No polyuria, polydypsia, cold/heat intolerance  Heme:  No petechiae, ecchymosis, easy bruisability  Skin:  No rash, tattoos, scars, edema      Vital Signs Last 24 Hrs  T(C): 36.7 (01 Sep 2019 07:36), Max: 37 (31 Aug 2019 23:28)  T(F): 98.1 (01 Sep 2019 07:36), Max: 98.6 (31 Aug 2019 23:28)  HR: 53 (01 Sep 2019 07:36) (53 - 64)  BP: 128/55 (01 Sep 2019 07:36) (120/58 - 144/60)  BP(mean): --  RR: 18 (01 Sep 2019 07:36) (17 - 18)  SpO2: 95% (01 Sep 2019 07:36) (92% - 98%)    PHYSICAL EXAM:    General:  nad  HEENT:  NC/AT  Chest:  dec bs  Cardiovascular:  Regular rhythm, S1, S2  Abdomen:  Soft, non-tender, mild dt  Extremities:  no  edema  Skin:  No rash  Neuro/Psych:  A&Ox3      LABS:                        7.9    x     )-----------( x        ( 31 Aug 2019 20:40 )             26.0     08-31    145  |  115<H>  |  64<H>  ----------------------------<  104<H>  5.1   |  19<L>  |  4.30<H>    Ca    8.1<L>      31 Aug 2019 07:19    TPro  5.6<L>  /  Alb  2.3<L>  /  TBili  0.3  /  DBili  x   /  AST  23  /  ALT  29  /  AlkPhos  82  08-31          RADIOLOGY & ADDITIONAL TESTS:

## 2019-09-01 NOTE — PROGRESS NOTE ADULT - SUBJECTIVE AND OBJECTIVE BOX
[facility] -- Flaca Summers, Grupo, Missael, Adan Heredia Savella  Office # [phone]    Follow Up: CHF Exacerbation       HPI:  Pt is a 80 yo F w/ PMH Non-Hodgkins lymphoma, High cholesterol, DM (on insulin),HTN, chronic Renal failure, angioplasty 2009, removal of R kidney and gall bladder 2011, chemo 2011 that presents to [facility] ED c/o SOB for the past 3-4 days. She explains that she has been traveling recently. She just got back from [place]. Her shortness of breath began during her trip. She explains she has never had this before. She was having trouble walking long distances before becoming out of breath and needing to stop walking. She rates the SOB 7/10. She also admits to leg swelling, LE spasms/pain, orthopnea and palpations. She recently had a positive FOBT outpatient and was scheduled for a colonoscopy but has yet to receive it. She denies abdominal pain, chest pain, dyspnea, numbness, tingling, syncope, dizziness.     In ED /60, HR 88, SPO2 98 on room air. No leukocytosis, afebrile.   BUN 51, Cr 3.7, Lactate 1.3, BNP 16,687, trop .084, H/H 9.2/30.7  EKG: Sinus rhythm, PVCs, PACs. LVH. LA enlargement.   CXR: small b/l pleural effusions and bibasilar atelectasis. (29 Aug 2019 14:55)      Subjective/Observations:     Pt. seen examined and evaluated. Pt. resting comfortably in bed in NAD, with no respiratory distress, no chest pain, dyspnea, palpitations, PND or orthopnea     REVIEW OF SYSTEMS: All other review of systems is negative unless indicated above    PAST MEDICAL & SURGICAL HISTORY:  CAD S/P percutaneous coronary angioplasty  Non-Hodgkins lymphoma  High cholesterol  DM (diabetes mellitus)  HTN (hypertension)  Renal failure  History of cholecystectomy  History of right nephrectomy      MEDICATIONS  (STANDING):  amLODIPine   Tablet 10 milliGRAM(s) Oral daily  aspirin enteric coated 81 milliGRAM(s) Oral daily  atorvastatin 80 milliGRAM(s) Oral at bedtime  chlorhexidine 2% Cloths 1 Application(s) Topical daily  dextrose 5%. 1000 milliLiter(s) (50 mL/Hr) IV Continuous <Continuous>  dextrose 50% Injectable 12.5 Gram(s) IV Push once  dextrose 50% Injectable 25 Gram(s) IV Push once  dextrose 50% Injectable 25 Gram(s) IV Push once  epoetin brett Injectable 04667 Unit(s) SubCutaneous <User Schedule>  furosemide    Tablet 40 milliGRAM(s) Oral daily  hydrALAZINE 50 milliGRAM(s) Oral three times a day  insulin lispro (HumaLOG) corrective regimen sliding scale   SubCutaneous three times a day before meals  insulin lispro (HumaLOG) corrective regimen sliding scale   SubCutaneous at bedtime  iron sucrose Injectable 100 milliGRAM(s) IV Push <User Schedule>  metoprolol tartrate 25 milliGRAM(s) Oral two times a day  pantoprazole    Tablet 40 milliGRAM(s) Oral before breakfast    MEDICATIONS  (PRN):  dextrose 40% Gel 15 Gram(s) Oral once PRN Blood Glucose LESS THAN 70 milliGRAM(s)/deciliter  glucagon  Injectable 1 milliGRAM(s) IntraMuscular once PRN Glucose LESS THAN 70 milligrams/deciliter      Allergies    No Known Allergies    Intolerances        Vital Signs Last 24 Hrs  T(C): 36.7 (01 Sep 2019 07:36), Max: 37 (31 Aug 2019 23:28)  T(F): 98.1 (01 Sep 2019 07:36), Max: 98.6 (31 Aug 2019 23:28)  HR: 53 (01 Sep 2019 07:36) (53 - 64)  BP: 128/55 (01 Sep 2019 07:36) (120/58 - 144/60)  BP(mean): --  RR: 18 (01 Sep 2019 07:36) (17 - 18)  SpO2: 95% (01 Sep 2019 07:36) (92% - 98%)    I&O's Summary    01 Sep 2019 07:01  -  01 Sep 2019 08:15  --------------------------------------------------------  IN: 0 mL / OUT: 200 mL / NET: -200 mL          PHYSICAL EXAM:    Constitutional: NAD, awake and alert, well-developed  HEENT: Moist Mucous Membranes, Anicteric  Pulmonary: Non-labored, breath sounds are clear bilaterally, No wheezing, rales or rhonchi  Cardiovascular: Regular, S1 and S2, No murmurs, rubs, gallops or clicks  Gastrointestinal: Bowel Sounds present, soft, nontender.   Lymph: No peripheral edema. No lymphadenopathy.  Skin: No visible rashes or ulcers.  Psych:  Mood & affect appropriate    LABS: All Labs Reviewed:        Interpretation of Telemetry:       ECHO:         Imaging: [facility] -- Flaca Summers, Grupo, Missael, Adan Heredia Savella  Office # [phone]    Follow Up: CHF Exacerbation       HPI:  Pt is a 80 yo F w/ PMH Non-Hodgkins lymphoma, High cholesterol, DM (on insulin),HTN, chronic Renal failure, angioplasty 2009, removal of R kidney and gall bladder 2011, chemo 2011 that presents to [facility] ED c/o SOB for the past 3-4 days. She explains that she has been traveling recently. She just got back from [place]. Her shortness of breath began during her trip. She explains she has never had this before. She was having trouble walking long distances before becoming out of breath and needing to stop walking. She rates the SOB 7/10. She also admits to leg swelling, LE spasms/pain, orthopnea and palpations. She recently had a positive FOBT outpatient and was scheduled for a colonoscopy but has yet to receive it. She denies abdominal pain, chest pain, dyspnea, numbness, tingling, syncope, dizziness.     In ED /60, HR 88, SPO2 98 on room air. No leukocytosis, afebrile.   BUN 51, Cr 3.7, Lactate 1.3, BNP 16,687, trop .084, H/H 9.2/30.7  EKG: Sinus rhythm, PVCs, PACs. LVH. LA enlargement.   CXR: small b/l pleural effusions and bibasilar atelectasis. (29 Aug 2019 14:55)      Subjective/Observations:     Pt. seen examined and evaluated. Pt. resting comfortably in bed in NAD, with no respiratory distress, no chest pain, dyspnea, palpitations, PND or orthopnea     REVIEW OF SYSTEMS: All other review of systems is negative unless indicated above    PAST MEDICAL & SURGICAL HISTORY:  CAD S/P percutaneous coronary angioplasty  Non-Hodgkins lymphoma  High cholesterol  DM (diabetes mellitus)  HTN (hypertension)  Renal failure  History of cholecystectomy  History of right nephrectomy      MEDICATIONS  (STANDING):  amLODIPine   Tablet 10 milliGRAM(s) Oral daily  aspirin enteric coated 81 milliGRAM(s) Oral daily  atorvastatin 80 milliGRAM(s) Oral at bedtime  chlorhexidine 2% Cloths 1 Application(s) Topical daily  dextrose 5%. 1000 milliLiter(s) (50 mL/Hr) IV Continuous <Continuous>  dextrose 50% Injectable 12.5 Gram(s) IV Push once  dextrose 50% Injectable 25 Gram(s) IV Push once  dextrose 50% Injectable 25 Gram(s) IV Push once  epoetin brett Injectable 85886 Unit(s) SubCutaneous <User Schedule>  furosemide    Tablet 40 milliGRAM(s) Oral daily  hydrALAZINE 50 milliGRAM(s) Oral three times a day  insulin lispro (HumaLOG) corrective regimen sliding scale   SubCutaneous three times a day before meals  insulin lispro (HumaLOG) corrective regimen sliding scale   SubCutaneous at bedtime  iron sucrose Injectable 100 milliGRAM(s) IV Push <User Schedule>  metoprolol tartrate 25 milliGRAM(s) Oral two times a day  pantoprazole    Tablet 40 milliGRAM(s) Oral before breakfast    MEDICATIONS  (PRN):  dextrose 40% Gel 15 Gram(s) Oral once PRN Blood Glucose LESS THAN 70 milliGRAM(s)/deciliter  glucagon  Injectable 1 milliGRAM(s) IntraMuscular once PRN Glucose LESS THAN 70 milligrams/deciliter      Allergies    No Known Allergies    Intolerances        Vital Signs Last 24 Hrs  T(C): 36.7 (01 Sep 2019 07:36), Max: 37 (31 Aug 2019 23:28)  T(F): 98.1 (01 Sep 2019 07:36), Max: 98.6 (31 Aug 2019 23:28)  HR: 53 (01 Sep 2019 07:36) (53 - 64)  BP: 128/55 (01 Sep 2019 07:36) (120/58 - 144/60)  BP(mean): --  RR: 18 (01 Sep 2019 07:36) (17 - 18)  SpO2: 95% (01 Sep 2019 07:36) (92% - 98%)    I&O's Summary    01 Sep 2019 07:01  -  01 Sep 2019 08:15  --------------------------------------------------------  IN: 0 mL / OUT: 200 mL / NET: -200 mL          PHYSICAL EXAM:    Constitutional: NAD, awake and alert, well-developed  HEENT: Moist Mucous Membranes, Anicteric  Pulmonary: Non-labored, breath sounds are clear bilaterally, No wheezing, rales or rhonchi  Cardiovascular: Regular, S1 and S2, No murmurs, rubs, gallops or clicks  Gastrointestinal: Bowel Sounds present, soft, nontender.   Lymph: No peripheral edema. No lymphadenopathy.  Skin: No visible rashes or ulcers.  Psych:  Mood & affect appropriate    LABS: All Labs Reviewed:        Interpretation of Telemetry: Overnight on telemetry SB 50-60       ECHO:   < from: TTE Echo Doppler w/o Cont (08.29.19 @ 16:00) >  OBSERVATIONS:    Mitral Valve: normal, mild to moderate MR.  Aortic Valve/Aorta: Calcified trileaflet aortic valve with decreased   opening. Aortic valve area is calculated to be 1.2 sq cm, consistent with   moderate aortic stenosis  Tricuspid Valve: normal with moderate TR.  Pulmonic Valve: normal  Left Atrium: Enlarged  Right Atrium: Enlarged  Left Ventricle: normal LV size and systolic function, estimated LVEF of   65%.  Right Ventricle: Grossly normal size and systolic function.  Pericardium/Pleura: normal, no significant pericardial effusion.  Pulmonary/RV Pressure: estimated PA systolic pressure of 52 mmHg   LV diastolic dysfunction is present  Large bilateral pleural effusions    Conclusion:     Normal left ventricular internal dimensions and systolic function,   estimated LVEF of 65%.   Grossly normal RV size and systolic function.   Biatrial enlargement  Calcified trileaflet aortic valve with decreased opening. Aortic valve   area is calculated to be 1.2 sq cm, consistent with moderate aortic   stenosis  Mild to moderate MR   Moderate TR.    Estimated PA systolic pressure of 52 mmHg.    No significant pericardial effusion.  Large bilateral pleural effusions          Imaging:

## 2019-09-01 NOTE — PROGRESS NOTE ADULT - PROBLEM SELECTOR PLAN 1
seen and examined, vs and meds reviewed, labs reviewed, am labs pending  now on LASIX PO daily -   on o2 support  family at the bedside  CHF - valvular heart disease and CKD -   US chest reviewed - small effusions  Cardio and renal following  cont to monitor renal indices  will follow  prognosis guarded  check sat at rest and on exertion.

## 2019-09-01 NOTE — PROGRESS NOTE ADULT - PROBLEM SELECTOR PLAN 3
-BUN/Cr: 51/3.7, baseline 7/2019 ranges from 3-5.   -Acute on chronic renal failure  -Renal diet, fluid restriction to 2 L  -Monitor BMP, Cr, K in AM  -Nephro consulted, f/u recs  -avoid nephrotoxic meds -BUN/Cr: 51/3.7, baseline 7/2019 ranges from 3-4, CKD 5  -Acute on chronic renal failure  -Renal diet, fluid restriction to 2 L  -Monitor BMP, Cr, K in AM  -Nephro consulted, f/u recs  -avoid nephrotoxic meds

## 2019-09-01 NOTE — CHART NOTE - NSCHARTNOTEFT_GEN_A_CORE
RN called due to patient having pain in right upper extremity. Patient seen and examined. Patient has mild swelling of the right upper extremity that is tender to the touch. Patient rates the pain at a 6/10.     T(C): 36.7 (09-01-19 @ 16:01), Max: 37 (08-31-19 @ 23:28)  HR: 62 (09-01-19 @ 16:01) (53 - 62)  BP: 127/58 (09-01-19 @ 16:01) (117/49 - 128/55)  RR: 18 (09-01-19 @ 16:01) (18 - 18)  SpO2: 96% (09-01-19 @ 16:01) (91% - 96%)    Physical Exam  GENERAL: patient appears well, no acute distress, appropriate, pleasant  Extremities: Mild right upper extremity swelling, tender to the touch RN called due to patient having pain in right upper extremity. Patient seen and examined. Patient has mild swelling of the right upper extremity that is tender to the touch.  Patient rates the pain at a 7/10. Tylenol will be ordered for pain. Encourage elevation of right arm and well as ice to decrease swelling.     T(C): 36.7 (09-01-19 @ 16:01), Max: 37 (08-31-19 @ 23:28)  HR: 62 (09-01-19 @ 16:01) (53 - 62)  BP: 127/58 (09-01-19 @ 16:01) (117/49 - 128/55)  RR: 18 (09-01-19 @ 16:01) (18 - 18)  SpO2: 96% (09-01-19 @ 16:01) (91% - 96%)    Physical Exam  GENERAL: patient appears well, no acute distress, appropriate, pleasant  Extremities: Mild right upper extremity swelling, tender to the touch with minimal redness.

## 2019-09-01 NOTE — PROGRESS NOTE ADULT - SUBJECTIVE AND OBJECTIVE BOX
Patient is a 79y old  Female who presents with a chief complaint of CHF exaceration, SOB (30 Aug 2019 11:10)  Patient seen in follow up for CKD 4, SOB.     Feels better. No new complaints.     PAST MEDICAL HISTORY:  CAD S/P percutaneous coronary angioplasty  Non-Hodgkins lymphoma  High cholesterol  DM (diabetes mellitus)  HTN (hypertension)  Renal failure    MEDICATIONS  (STANDING):  amLODIPine   Tablet 10 milliGRAM(s) Oral daily  aspirin enteric coated 81 milliGRAM(s) Oral daily  atorvastatin 80 milliGRAM(s) Oral at bedtime  chlorhexidine 2% Cloths 1 Application(s) Topical daily  dextrose 5%. 1000 milliLiter(s) (50 mL/Hr) IV Continuous <Continuous>  dextrose 50% Injectable 12.5 Gram(s) IV Push once  dextrose 50% Injectable 25 Gram(s) IV Push once  dextrose 50% Injectable 25 Gram(s) IV Push once  epoetin brett Injectable 08865 Unit(s) SubCutaneous <User Schedule>  furosemide    Tablet 40 milliGRAM(s) Oral daily  hydrALAZINE 50 milliGRAM(s) Oral three times a day  insulin lispro (HumaLOG) corrective regimen sliding scale   SubCutaneous three times a day before meals  insulin lispro (HumaLOG) corrective regimen sliding scale   SubCutaneous at bedtime  iron sucrose Injectable 100 milliGRAM(s) IV Push <User Schedule>  metoprolol tartrate 25 milliGRAM(s) Oral two times a day  pantoprazole    Tablet 40 milliGRAM(s) Oral before breakfast  sodium bicarbonate 650 milliGRAM(s) Oral two times a day    MEDICATIONS  (PRN):  dextrose 40% Gel 15 Gram(s) Oral once PRN Blood Glucose LESS THAN 70 milliGRAM(s)/deciliter  glucagon  Injectable 1 milliGRAM(s) IntraMuscular once PRN Glucose LESS THAN 70 milligrams/deciliter    T(C): 36.8 (09-01-19 @ 11:26), Max: 37.2 (08-31-19 @ 00:00)  HR: 57 (09-01-19 @ 11:26) (53 - 65)  BP: 117/49 (09-01-19 @ 11:26) (115/58 - 144/60)  RR: 18 (09-01-19 @ 11:26)  SpO2: 93% (09-01-19 @ 11:39)  Wt(kg): --  I&O's Detail    01 Sep 2019 07:01  -  01 Sep 2019 11:42  --------------------------------------------------------  IN:  Total IN: 0 mL    OUT:    Voided: 200 mL  Total OUT: 200 mL    Total NET: -200 mL      PHYSICAL EXAM:  General: NAD  Respiratory: b/l air entry  Cardiovascular: S1 S2  Gastrointestinal: soft  Extremities:  edema                   LABORATORY:                        7.9    8.32  )-----------( 300      ( 01 Sep 2019 08:25 )             26.5     09-01    144  |  113<H>  |  75<H>  ----------------------------<  119<H>  5.1   |  19<L>  |  4.70<H>    Ca    8.4<L>      01 Sep 2019 08:25    TPro  5.6<L>  /  Alb  2.3<L>  /  TBili  0.3  /  DBili  x   /  AST  23  /  ALT  29  /  AlkPhos  82  08-31    Sodium, Serum: 144 mmol/L (09-01 @ 08:25)  Sodium, Serum: 145 mmol/L (08-31 @ 07:19)    Potassium, Serum: 5.1 mmol/L (09-01 @ 08:25)  Potassium, Serum: 5.1 mmol/L (08-31 @ 07:19)    Hemoglobin: 7.9 g/dL (09-01 @ 08:25)  Hemoglobin: 7.9 g/dL (08-31 @ 20:40)  Hemoglobin: 7.6 g/dL (08-31 @ 07:19)  Hemoglobin: 8.4 g/dL (08-30 @ 10:02)    Creatinine, Serum 4.70 (09-01 @ 08:25)  Creatinine, Serum 4.30 (08-31 @ 07:19)  Creatinine, Serum 4.00 (08-30 @ 06:08)        LIVER FUNCTIONS - ( 31 Aug 2019 07:19 )  Alb: 2.3 g/dL / Pro: 5.6 g/dL / ALK PHOS: 82 U/L / ALT: 29 U/L / AST: 23 U/L / GGT: x

## 2019-09-01 NOTE — PROGRESS NOTE ADULT - SUBJECTIVE AND OBJECTIVE BOX
Date/Time Patient Seen:  		  Referring MD:   Data Reviewed	       Patient is a 79y old  Female who presents with a chief complaint of CHF exaceration, SOB (31 Aug 2019 10:20)      Subjective/HPI     PAST MEDICAL & SURGICAL HISTORY:  CAD S/P percutaneous coronary angioplasty  Non-Hodgkins lymphoma  High cholesterol  DM (diabetes mellitus)  HTN (hypertension)  Renal failure  History of cholecystectomy  History of right nephrectomy        Medication list         MEDICATIONS  (STANDING):  amLODIPine   Tablet 10 milliGRAM(s) Oral daily  aspirin enteric coated 81 milliGRAM(s) Oral daily  atorvastatin 80 milliGRAM(s) Oral at bedtime  chlorhexidine 2% Cloths 1 Application(s) Topical daily  dextrose 5%. 1000 milliLiter(s) (50 mL/Hr) IV Continuous <Continuous>  dextrose 50% Injectable 12.5 Gram(s) IV Push once  dextrose 50% Injectable 25 Gram(s) IV Push once  dextrose 50% Injectable 25 Gram(s) IV Push once  epoetin brett Injectable 59288 Unit(s) SubCutaneous <User Schedule>  furosemide    Tablet 40 milliGRAM(s) Oral daily  hydrALAZINE 50 milliGRAM(s) Oral three times a day  insulin lispro (HumaLOG) corrective regimen sliding scale   SubCutaneous three times a day before meals  insulin lispro (HumaLOG) corrective regimen sliding scale   SubCutaneous at bedtime  iron sucrose Injectable 100 milliGRAM(s) IV Push <User Schedule>  metoprolol tartrate 25 milliGRAM(s) Oral two times a day  pantoprazole    Tablet 40 milliGRAM(s) Oral before breakfast    MEDICATIONS  (PRN):  dextrose 40% Gel 15 Gram(s) Oral once PRN Blood Glucose LESS THAN 70 milliGRAM(s)/deciliter  glucagon  Injectable 1 milliGRAM(s) IntraMuscular once PRN Glucose LESS THAN 70 milligrams/deciliter         Vitals log        ICU Vital Signs Last 24 Hrs  T(C): 37 (01 Sep 2019 04:01), Max: 37 (31 Aug 2019 23:28)  T(F): 98.6 (01 Sep 2019 04:01), Max: 98.6 (31 Aug 2019 23:28)  HR: 61 (01 Sep 2019 04:01) (54 - 64)  BP: 120/58 (01 Sep 2019 04:01) (120/58 - 144/60)  BP(mean): --  ABP: --  ABP(mean): --  RR: 18 (01 Sep 2019 04:01) (17 - 18)  SpO2: 92% (01 Sep 2019 04:01) (92% - 98%)           Input and Output:  I&O's Detail    30 Aug 2019 07:01  -  31 Aug 2019 07:00  --------------------------------------------------------  IN:    Oral Fluid: 240 mL  Total IN: 240 mL    OUT:    Voided: 350 mL  Total OUT: 350 mL    Total NET: -110 mL          Lab Data                        7.9    x     )-----------( x        ( 31 Aug 2019 20:40 )             26.0     08-31    145  |  115<H>  |  64<H>  ----------------------------<  104<H>  5.1   |  19<L>  |  4.30<H>    Ca    8.1<L>      31 Aug 2019 07:19    TPro  5.6<L>  /  Alb  2.3<L>  /  TBili  0.3  /  DBili  x   /  AST  23  /  ALT  29  /  AlkPhos  82  08-31            Review of Systems	      Objective     Physical Examination  heart s1s2  lung dec BS  abd soft        Pertinent Lab findings & Imaging      Miguel Ángel:  NO   Adequate UO     I&O's Detail    30 Aug 2019 07:01  -  31 Aug 2019 07:00  --------------------------------------------------------  IN:    Oral Fluid: 240 mL  Total IN: 240 mL    OUT:    Voided: 350 mL  Total OUT: 350 mL    Total NET: -110 mL               Discussed with:     Cultures:	        Radiology

## 2019-09-01 NOTE — PROGRESS NOTE ADULT - PROBLEM SELECTOR PLAN 2
Noted on TTE , bilat large pleural effusions  US shows not much fluid though  Pulm following  Continue IV Nacho Daniel to see patient Noted on TTE , bilat large pleural effusions  US shows not much fluid though  Pulm following  apprec pulm recs  cont lasix po

## 2019-09-01 NOTE — PROGRESS NOTE ADULT - SUBJECTIVE AND OBJECTIVE BOX
Patient is a 79y old  Female who presents with a chief complaint of CHF exaceration, SOB (01 Sep 2019 06:27)      INTERVAL HPI: Pt seen and examined. States she is feeling much better. Denies any other acte complaints at this time.  at bedside.     OVERNIGHT EVENTS: none noted  T(F): 98.1 (09-01-19 @ 07:36), Max: 98.6 (08-31-19 @ 23:28)  HR: 53 (09-01-19 @ 07:36) (53 - 64)  BP: 128/55 (09-01-19 @ 07:36) (120/58 - 144/60)  RR: 18 (09-01-19 @ 07:36) (17 - 18)  SpO2: 95% (09-01-19 @ 07:36) (92% - 98%)  I&O's Summary      REVIEW OF SYSTEMS:  CONSTITUTIONAL: No fever, weight loss, or fatigue  EYES: No eye pain, visual disturbances, or discharge  ENMT:  No difficulty hearing, tinnitus, vertigo; No sinus or throat pain  NECK: No pain or stiffness  BREASTS: No pain, masses, or nipple discharge  RESPIRATORY: No cough, wheezing, chills or hemoptysis; No shortness of breath  CARDIOVASCULAR: No chest pain, palpitations, dizziness, or leg swelling  GASTROINTESTINAL: No abdominal or epigastric pain. No nausea, vomiting, or hematemesis; No diarrhea or constipation. No melena or hematochezia.  GENITOURINARY: No dysuria, frequency, hematuria, or incontinence  NEUROLOGICAL: No headaches, memory loss, loss of strength, numbness, or tremors  SKIN: No itching, burning, rashes, or lesions   LYMPH NODES: No enlarged glands  ENDOCRINE: No heat or cold intolerance; No hair loss  MUSCULOSKELETAL: No joint pain or swelling; No muscle, back, or extremity pain  PSYCHIATRIC: No depression, anxiety, mood swings, or difficulty sleeping  HEME/LYMPH: No easy bruising, or bleeding gums  ALLERY AND IMMUNOLOGIC: No hives or eczema    PHYSICAL EXAM:  GENERAL: NAD, well-groomed, well-developed  HEAD:  Atraumatic, Normocephalic  EYES: EOMI, PERRLA, conjunctiva and sclera clear  ENMT: No tonsillar erythema, exudates, or enlargement; Moist mucous membranes, Good dentition, No lesions  NECK: Supple, No JVD, Normal thyroid  NERVOUS SYSTEM:  Alert & Oriented X3, Good concentration; Motor Strength 5/5 B/L upper and lower extremities; DTRs 2+ intact and symmetric  CHEST/LUNG: Clear to percussion bilaterally; No rales, rhonchi, wheezing, or rubs  HEART: Regular rate and rhythm; No murmurs, rubs, or gallops  ABDOMEN: Soft, Nontender, Nondistended; Bowel sounds present  EXTREMITIES:  2+ Peripheral Pulses, No clubbing, cyanosis, or edema  LYMPH: No lymphadenopathy noted  SKIN: No rashes or lesions    LABS:                        7.9    x     )-----------( x        ( 31 Aug 2019 20:40 )             26.0     08-31    145  |  115<H>  |  64<H>  ----------------------------<  104<H>  5.1   |  19<L>  |  4.30<H>    Ca    8.1<L>      31 Aug 2019 07:19    TPro  5.6<L>  /  Alb  2.3<L>  /  TBili  0.3  /  DBili  x   /  AST  23  /  ALT  29  /  AlkPhos  82  08-31        CAPILLARY BLOOD GLUCOSE      POCT Blood Glucose.: 159 mg/dL (31 Aug 2019 21:42)  POCT Blood Glucose.: 149 mg/dL (31 Aug 2019 16:57)  POCT Blood Glucose.: 145 mg/dL (31 Aug 2019 12:01)  POCT Blood Glucose.: 111 mg/dL (31 Aug 2019 08:04)      08-29 @ 21:14   <10,000 CFU/mL Normal Urogenital Marla  --  --  08-29 @ 17:08   No growth to date.  --  --          MEDICATIONS  (STANDING):  amLODIPine   Tablet 10 milliGRAM(s) Oral daily  aspirin enteric coated 81 milliGRAM(s) Oral daily  atorvastatin 80 milliGRAM(s) Oral at bedtime  chlorhexidine 2% Cloths 1 Application(s) Topical daily  dextrose 5%. 1000 milliLiter(s) (50 mL/Hr) IV Continuous <Continuous>  dextrose 50% Injectable 12.5 Gram(s) IV Push once  dextrose 50% Injectable 25 Gram(s) IV Push once  dextrose 50% Injectable 25 Gram(s) IV Push once  epoetin brett Injectable 05298 Unit(s) SubCutaneous <User Schedule>  furosemide    Tablet 40 milliGRAM(s) Oral daily  hydrALAZINE 50 milliGRAM(s) Oral three times a day  insulin lispro (HumaLOG) corrective regimen sliding scale   SubCutaneous three times a day before meals  insulin lispro (HumaLOG) corrective regimen sliding scale   SubCutaneous at bedtime  iron sucrose Injectable 100 milliGRAM(s) IV Push <User Schedule>  metoprolol tartrate 25 milliGRAM(s) Oral two times a day  pantoprazole    Tablet 40 milliGRAM(s) Oral before breakfast    MEDICATIONS  (PRN):  dextrose 40% Gel 15 Gram(s) Oral once PRN Blood Glucose LESS THAN 70 milliGRAM(s)/deciliter  glucagon  Injectable 1 milliGRAM(s) IntraMuscular once PRN Glucose LESS THAN 70 milligrams/deciliter Patient is a 79y old  Female who presents with a chief complaint of CHF exaceration, SOB (01 Sep 2019 06:27)      INTERVAL HPI: Pt seen and examined. States she is feeling much better. Denies any other acte complaints at this time.  at bedside.     OVERNIGHT EVENTS: none noted  T(F): 98.1 (09-01-19 @ 07:36), Max: 98.6 (08-31-19 @ 23:28)  HR: 53 (09-01-19 @ 07:36) (53 - 64)  BP: 128/55 (09-01-19 @ 07:36) (120/58 - 144/60)  RR: 18 (09-01-19 @ 07:36) (17 - 18)  SpO2: 95% (09-01-19 @ 07:36) (92% - 98%)  I&O's Summary      REVIEW OF SYSTEMS:  CONSTITUTIONAL: No fever, weight loss, or fatigue  RESPIRATORY: No cough, wheezing, chills or hemoptysis; No shortness of breath  CARDIOVASCULAR: No chest pain, palpitations, dizziness, or leg swelling  GASTROINTESTINAL: No abdominal or epigastric pain. No nausea, vomiting, or hematemesis; No diarrhea or constipation. No melena or hematochezia.  GENITOURINARY: No dysuria, frequency, hematuria, or incontinence  NEUROLOGICAL: No headaches, memory loss, loss of strength, numbness, or tremors  SKIN: No itching, burning, rashes, or lesions   MUSCULOSKELETAL: No joint pain or swelling; No muscle, back, or extremity pain  PSYCHIATRIC: No depression, anxiety, mood swings, or difficulty sleeping      PHYSICAL EXAM:  GENERAL: NAD, well-groomed, well-developed  NERVOUS SYSTEM:  Motor Strength 4/5 B/L upper and lower extremities; DTRs 2+ intact and symmetric  CHEST/LUNG: Clear to percussion bilaterally; No rales, rhonchi, wheezing, or rubs on NC O2  HEART: Regular rate and rhythm; No murmurs, rubs, or gallops  ABDOMEN: Soft, Nontender, Nondistended; Bowel sounds present  EXTREMITIES:  2+ Peripheral Pulses, No clubbing, cyanosis, or edema  SKIN: No rashes or lesions    LABS:                        7.9    x     )-----------( x        ( 31 Aug 2019 20:40 )             26.0     08-31    145  |  115<H>  |  64<H>  ----------------------------<  104<H>  5.1   |  19<L>  |  4.30<H>    Ca    8.1<L>      31 Aug 2019 07:19    TPro  5.6<L>  /  Alb  2.3<L>  /  TBili  0.3  /  DBili  x   /  AST  23  /  ALT  29  /  AlkPhos  82  08-31        CAPILLARY BLOOD GLUCOSE      POCT Blood Glucose.: 159 mg/dL (31 Aug 2019 21:42)  POCT Blood Glucose.: 149 mg/dL (31 Aug 2019 16:57)  POCT Blood Glucose.: 145 mg/dL (31 Aug 2019 12:01)  POCT Blood Glucose.: 111 mg/dL (31 Aug 2019 08:04)      08-29 @ 21:14   <10,000 CFU/mL Normal Urogenital Marla  --  --  08-29 @ 17:08   No growth to date.  --  --          MEDICATIONS  (STANDING):  amLODIPine   Tablet 10 milliGRAM(s) Oral daily  aspirin enteric coated 81 milliGRAM(s) Oral daily  atorvastatin 80 milliGRAM(s) Oral at bedtime  chlorhexidine 2% Cloths 1 Application(s) Topical daily  dextrose 5%. 1000 milliLiter(s) (50 mL/Hr) IV Continuous <Continuous>  dextrose 50% Injectable 12.5 Gram(s) IV Push once  dextrose 50% Injectable 25 Gram(s) IV Push once  dextrose 50% Injectable 25 Gram(s) IV Push once  epoetin brett Injectable 02745 Unit(s) SubCutaneous <User Schedule>  furosemide    Tablet 40 milliGRAM(s) Oral daily  hydrALAZINE 50 milliGRAM(s) Oral three times a day  insulin lispro (HumaLOG) corrective regimen sliding scale   SubCutaneous three times a day before meals  insulin lispro (HumaLOG) corrective regimen sliding scale   SubCutaneous at bedtime  iron sucrose Injectable 100 milliGRAM(s) IV Push <User Schedule>  metoprolol tartrate 25 milliGRAM(s) Oral two times a day  pantoprazole    Tablet 40 milliGRAM(s) Oral before breakfast    MEDICATIONS  (PRN):  dextrose 40% Gel 15 Gram(s) Oral once PRN Blood Glucose LESS THAN 70 milliGRAM(s)/deciliter  glucagon  Injectable 1 milliGRAM(s) IntraMuscular once PRN Glucose LESS THAN 70 milligrams/deciliter

## 2019-09-01 NOTE — PROGRESS NOTE ADULT - PROBLEM SELECTOR PLAN 8
-continue home dose asa  -do not restart Clopidogrel as per cardio  -trop was 0.084, trending cardiac enzymes Q6 x2, f/u results.   -repeat EKG AM -continue home dose asa  -do not restart Clopidogrel as per cardio  -repeat EKG AM

## 2019-09-02 LAB
ANION GAP SERPL CALC-SCNC: 12 MMOL/L — SIGNIFICANT CHANGE UP (ref 5–17)
BUN SERPL-MCNC: 82 MG/DL — HIGH (ref 7–23)
CALCIUM SERPL-MCNC: 8 MG/DL — LOW (ref 8.5–10.1)
CHLORIDE SERPL-SCNC: 113 MMOL/L — HIGH (ref 96–108)
CO2 SERPL-SCNC: 18 MMOL/L — LOW (ref 22–31)
CREAT SERPL-MCNC: 5 MG/DL — HIGH (ref 0.5–1.3)
GLUCOSE SERPL-MCNC: 121 MG/DL — HIGH (ref 70–99)
HCT VFR BLD CALC: 25.6 % — LOW (ref 34.5–45)
HGB BLD-MCNC: 7.6 G/DL — LOW (ref 11.5–15.5)
MCHC RBC-ENTMCNC: 27.1 PG — SIGNIFICANT CHANGE UP (ref 27–34)
MCHC RBC-ENTMCNC: 29.7 GM/DL — LOW (ref 32–36)
MCV RBC AUTO: 91.4 FL — SIGNIFICANT CHANGE UP (ref 80–100)
NRBC # BLD: 0 /100 WBCS — SIGNIFICANT CHANGE UP (ref 0–0)
PLATELET # BLD AUTO: 284 K/UL — SIGNIFICANT CHANGE UP (ref 150–400)
POTASSIUM SERPL-MCNC: 4.9 MMOL/L — SIGNIFICANT CHANGE UP (ref 3.5–5.3)
POTASSIUM SERPL-SCNC: 4.9 MMOL/L — SIGNIFICANT CHANGE UP (ref 3.5–5.3)
RBC # BLD: 2.8 M/UL — LOW (ref 3.8–5.2)
RBC # FLD: 19.1 % — HIGH (ref 10.3–14.5)
SODIUM SERPL-SCNC: 143 MMOL/L — SIGNIFICANT CHANGE UP (ref 135–145)
WBC # BLD: 9.37 K/UL — SIGNIFICANT CHANGE UP (ref 3.8–10.5)
WBC # FLD AUTO: 9.37 K/UL — SIGNIFICANT CHANGE UP (ref 3.8–10.5)

## 2019-09-02 PROCEDURE — 99232 SBSQ HOSP IP/OBS MODERATE 35: CPT

## 2019-09-02 PROCEDURE — 99233 SBSQ HOSP IP/OBS HIGH 50: CPT

## 2019-09-02 RX ORDER — ACETAMINOPHEN 500 MG
650 TABLET ORAL ONCE
Refills: 0 | Status: COMPLETED | OUTPATIENT
Start: 2019-09-02 | End: 2019-09-02

## 2019-09-02 RX ADMIN — Medication 50 MILLIGRAM(S): at 13:08

## 2019-09-02 RX ADMIN — Medication 650 MILLIGRAM(S): at 05:32

## 2019-09-02 RX ADMIN — Medication 50 MILLIGRAM(S): at 05:32

## 2019-09-02 RX ADMIN — Medication 50 MILLIGRAM(S): at 22:03

## 2019-09-02 RX ADMIN — ATORVASTATIN CALCIUM 80 MILLIGRAM(S): 80 TABLET, FILM COATED ORAL at 22:03

## 2019-09-02 RX ADMIN — Medication 1: at 13:13

## 2019-09-02 RX ADMIN — Medication 650 MILLIGRAM(S): at 05:31

## 2019-09-02 RX ADMIN — ERYTHROPOIETIN 10000 UNIT(S): 10000 INJECTION, SOLUTION INTRAVENOUS; SUBCUTANEOUS at 18:43

## 2019-09-02 RX ADMIN — Medication 40 MILLIGRAM(S): at 05:31

## 2019-09-02 RX ADMIN — PANTOPRAZOLE SODIUM 40 MILLIGRAM(S): 20 TABLET, DELAYED RELEASE ORAL at 05:31

## 2019-09-02 RX ADMIN — Medication 25 MILLIGRAM(S): at 18:10

## 2019-09-02 RX ADMIN — CHLORHEXIDINE GLUCONATE 1 APPLICATION(S): 213 SOLUTION TOPICAL at 13:14

## 2019-09-02 RX ADMIN — Medication 25 MILLIGRAM(S): at 05:32

## 2019-09-02 RX ADMIN — AMLODIPINE BESYLATE 10 MILLIGRAM(S): 2.5 TABLET ORAL at 05:31

## 2019-09-02 RX ADMIN — Medication 650 MILLIGRAM(S): at 08:01

## 2019-09-02 RX ADMIN — Medication 81 MILLIGRAM(S): at 13:08

## 2019-09-02 RX ADMIN — Medication 650 MILLIGRAM(S): at 18:10

## 2019-09-02 NOTE — PROGRESS NOTE ADULT - PROBLEM SELECTOR PLAN 9
-DVT prophylaxis : SCD , since dropping h/h and concern for GIB will hold Lovenox   IMPROVE VTE Individual Risk Assessment  RISK                                                                Points  [  ] Previous VTE                                                  3  [  ] Thrombophilia                                               2  [  ] Lower limb paralysis                                      2        (unable to hold up >15 seconds)    [  ] Current Cancer                                              2         (within 6 months)  [  ] Immobilization > 24 hrs                                1  [  ] ICU/CCU stay > 24 hours                              1  [  ] Age > 60                                                      1  IMPROVE VTE Score ____1_____  IMPROVE Score 0-1: Low Risk, No VTE prophylaxis required for most patients, encourage ambulation.   IMPROVE Score 2-3: At risk, pharmacologic VTE prophylaxis is indicated for most patients (in the absence of a contraindication)  IMPROVE Score > or = 4: High Risk, pharmacologic VTE prophylaxis is indicated for most patients (in the absence of a contraindication)

## 2019-09-02 NOTE — PROGRESS NOTE ADULT - PROBLEM SELECTOR PLAN 2
Noted on TTE , bilat large pleural effusions  US shows not much fluid though  Pulm following  apprec pulm recs  cont lasix po

## 2019-09-02 NOTE — PROGRESS NOTE ADULT - SUBJECTIVE AND OBJECTIVE BOX
Patient is a 79y old  Female who presents with a chief complaint of CHF exaceration, SOB (30 Aug 2019 11:10)  Patient seen in follow up for CKD 4, SOB.     Feels better. No new complaints. Family at bedside.     PAST MEDICAL HISTORY:  CAD S/P percutaneous coronary angioplasty  Non-Hodgkins lymphoma  High cholesterol  DM (diabetes mellitus)  HTN (hypertension)  Renal failure    MEDICATIONS  (STANDING):  amLODIPine   Tablet 10 milliGRAM(s) Oral daily  aspirin enteric coated 81 milliGRAM(s) Oral daily  atorvastatin 80 milliGRAM(s) Oral at bedtime  chlorhexidine 2% Cloths 1 Application(s) Topical daily  dextrose 5%. 1000 milliLiter(s) (50 mL/Hr) IV Continuous <Continuous>  dextrose 50% Injectable 12.5 Gram(s) IV Push once  dextrose 50% Injectable 25 Gram(s) IV Push once  dextrose 50% Injectable 25 Gram(s) IV Push once  epoetin brett Injectable 21041 Unit(s) SubCutaneous <User Schedule>  furosemide    Tablet 40 milliGRAM(s) Oral daily  hydrALAZINE 50 milliGRAM(s) Oral three times a day  insulin lispro (HumaLOG) corrective regimen sliding scale   SubCutaneous three times a day before meals  insulin lispro (HumaLOG) corrective regimen sliding scale   SubCutaneous at bedtime  metoprolol tartrate 25 milliGRAM(s) Oral two times a day  pantoprazole    Tablet 40 milliGRAM(s) Oral before breakfast  sodium bicarbonate 650 milliGRAM(s) Oral two times a day    MEDICATIONS  (PRN):  dextrose 40% Gel 15 Gram(s) Oral once PRN Blood Glucose LESS THAN 70 milliGRAM(s)/deciliter  glucagon  Injectable 1 milliGRAM(s) IntraMuscular once PRN Glucose LESS THAN 70 milligrams/deciliter    T(C): 37.1 (09-02-19 @ 11:45), Max: 37.1 (09-02-19 @ 11:45)  HR: 54 (09-02-19 @ 11:45) (50 - 64)  BP: 108/53 (09-02-19 @ 11:45) (97/62 - 144/60)  RR: 18 (09-02-19 @ 11:45)  SpO2: 96% (09-02-19 @ 11:45)  Wt(kg): --  I&O's Detail    01 Sep 2019 07:01  -  02 Sep 2019 07:00  --------------------------------------------------------  IN:  Total IN: 0 mL    OUT:    Voided: 200 mL  Total OUT: 200 mL    Total NET: -200 mL          PHYSICAL EXAM:  General: NAD  Respiratory: b/l air entry  Cardiovascular: S1 S2  Gastrointestinal: soft  Extremities:  edema                   LABORATORY:                        7.6    9.37  )-----------( 284      ( 02 Sep 2019 08:33 )             25.6     09-02    143  |  113<H>  |  82<H>  ----------------------------<  121<H>  4.9   |  18<L>  |  5.00<H>    Ca    8.0<L>      02 Sep 2019 08:33      Sodium, Serum: 143 mmol/L (09-02 @ 08:33)  Sodium, Serum: 144 mmol/L (09-01 @ 08:25)    Potassium, Serum: 4.9 mmol/L (09-02 @ 08:33)  Potassium, Serum: 5.1 mmol/L (09-01 @ 08:25)    Hemoglobin: 7.6 g/dL (09-02 @ 08:33)  Hemoglobin: 7.9 g/dL (09-01 @ 08:25)  Hemoglobin: 7.9 g/dL (08-31 @ 20:40)  Hemoglobin: 7.6 g/dL (08-31 @ 07:19)    Creatinine, Serum 5.00 (09-02 @ 08:33)  Creatinine, Serum 4.70 (09-01 @ 08:25)  Creatinine, Serum 4.30 (08-31 @ 07:19)

## 2019-09-02 NOTE — PROGRESS NOTE ADULT - PROBLEM SELECTOR PLAN 5
-type 2  -Consistent carb diet, renal and dash.   -hypoglycemic protocol in place  -sliding scale insulin  - A1c-6.6

## 2019-09-02 NOTE — PROGRESS NOTE ADULT - NSHPATTENDINGPLANDISCUSS_GEN_ALL_CORE
pt, RN, nephro, cardio, GI, family @ bedside - re: tx plan, disposition planning, above detailed plan
Patient, , RN
Consultants, RN, Patient
Kelsey MONTIEL

## 2019-09-02 NOTE — PROGRESS NOTE ADULT - ASSESSMENT
1.	CKD 4, CRS  2.	Dyspnea, Fluid overload  3.	Diabetes  4.	Hypertension  5.	Anemia    Rising creatinine trend. On PO lasix now. Hold d/c until renal function stable. Clinically improved.    Monitor blood sugar levels. Insulin coverage as needed. Dietary restriction.   Monitor BP trend. Titrate BP meds as needed. Salt restriction. Monitor h/h trend. Procrit for anemia. PO bicarb.   Will follow electrolytes and renal function trend. D/w family at bedside.   Pt wants to fly back to [facility] this weekend.

## 2019-09-02 NOTE — PROGRESS NOTE ADULT - PROBLEM SELECTOR PLAN 1
-SOB likely 2/2 to acute CHF exacerbation, diastolic dysfunction   -Echo results noted   -Pt BNP 16,687 in ED  -CXR revealed b/l pleural effusion and bibasilar atelectasis. Chest sono confirms pleural effusions b/l  -Cardio follow up, Dr. Lombardo   -Cardio recs now on po lasix 40mg daily  -Strict I&O as much as feasible  -Daily weight

## 2019-09-02 NOTE — PROGRESS NOTE ADULT - ASSESSMENT
Pt is a 78 yo F w/ PMH Non-Hodgkins lymphoma, High cholesterol, DM (on insulin),HTN, chronic Renal failure, angioplasty 2009, removal of R kidney and gall bladder 2011, chemo 2011 that presents to [facility] ED c/o SOB being admitted for SOB likely 2/2 to acute diastolic CHF exacerbation, bilat pleural effusions,  found to have anemia with recent stool for OB + as out patient, suspect GIB .

## 2019-09-02 NOTE — PROGRESS NOTE ADULT - SUBJECTIVE AND OBJECTIVE BOX
Patient is a 79y old  Female who presents with a chief complaint of CHF exaceration, SOB (02 Sep 2019 12:02)      FROM ADMISSION H+P:   HPI:  Pt is a 78 yo F w/ PMH Non-Hodgkins lymphoma, High cholesterol, DM (on insulin),HTN, chronic Renal failure, angioplasty 2009, removal of R kidney and gall bladder 2011, chemo 2011 that presents to [facility] ED c/o SOB for the past 3-4 days. She explains that she has been traveling recently. She just got back from [place]. Her shortness of breath began during her trip. She explains she has never had this before. She was having trouble walking long distances before becoming out of breath and needing to stop walking. She rates the SOB 7/10. She also admits to leg swelling, LE spasms/pain, orthopnea and palpations. She recently had a positive FOBT outpatient and was scheduled for a colonoscopy but has yet to receive it. She denies abdominal pain, chest pain, dyspnea, numbness, tingling, syncope, dizziness.     In ED /60, HR 88, SPO2 98 on room air. No leukocytosis, afebrile.   BUN 51, Cr 3.7, Lactate 1.3, BNP 16,687, trop .084, H/H 9.2/30.7  EKG: Sinus rhythm, PVCs, PACs. LVH. LA enlargement.   CXR: small b/l pleural effusions and bibasilar atelectasis. (29 Aug 2019 14:55)      ----  INTERVAL HPI/OVERNIGHT EVENTS: Pt seen and evaluated at the bedside. No acute overnight events occurred. Pt admits feeling "completely well" no other complaints and is eager for d/c. Denies edema in legs. Denies dyspnea. Denies urinary urgency or frequency. Denies CP. Pt appears dismissive of all of my questions and reports being very eager for d/c home.     ----  PAST MEDICAL & SURGICAL HISTORY:  CAD S/P percutaneous coronary angioplasty  Non-Hodgkins lymphoma  High cholesterol  DM (diabetes mellitus)  HTN (hypertension)  Renal failure  History of cholecystectomy  History of right nephrectomy      FAMILY HISTORY:  FH: diabetes mellitus      Allergies    No Known Allergies    Intolerances        ----  REVIEW OF SYSTEMS:  CONSTITUTIONAL: denies fever, chills    SKIN: denies new lesions, rash  CARDIOVASCULAR: denies chest pain, chest pressure, palpitations  RESPIRATORY: denies shortness of breath, sputum production  GASTROINTESTINAL: denies nausea, vomiting, diarrhea, abdominal pain  GENITOURINARY: denies dysuria, discharge, urgency, frequency  NEUROLOGICAL: denies numbness, headache, focal weakness  MUSCULOSKELETAL: denies new joint pain, muscle aches   LYMPHATICS: denies enlarged lymph nodes, extremity swelling   ENDOCRINOLOGIC: denies sweating, cold or heat intolerance    ----  PHYSICAL EXAM:  GENERAL: patient appears comfortable, well groomed, appropriately interactive  ENMT: oropharynx clear without erythema, moist mucous membranes  LUNGS: good air entry bilaterally, trace bibasilar rales, symmetric breath sounds, no wheezing or rhonchi appreciated  HEART: soft S1/S2, regular rate and rhythm, no murmurs noted, no noted edema to b/l LE  GASTROINTESTINAL: abdomen is soft, nontender, nondistended, normoactive bowel sounds, no palpable masses  INTEGUMENT: good skin turgor, appropriate for ethnicity, appears well perfused, no jaundice noted  MUSCULOSKELETAL: no clubbing or cyanosis, no obvious deformity  NEUROLOGIC: awake, alert, oriented x3, good muscle tone in 4 extremities, no obvious sensory deficits    T(C): 36.3 (09-02-19 @ 16:06), Max: 37.1 (09-02-19 @ 11:45)  HR: 59 (09-02-19 @ 16:06) (50 - 60)  BP: 94/60 (09-02-19 @ 16:06) (94/60 - 125/52)  RR: 18 (09-02-19 @ 16:06) (18 - 18)  SpO2: 95% (09-02-19 @ 16:06) (92% - 96%)  Wt(kg): --    ----  I&O's Summary    01 Sep 2019 07:01  -  02 Sep 2019 07:00  --------------------------------------------------------  IN: 0 mL / OUT: 200 mL / NET: -200 mL        LABS:                        7.6    9.37  )-----------( 284      ( 02 Sep 2019 08:33 )             25.6     09-02    143  |  113<H>  |  82<H>  ----------------------------<  121<H>  4.9   |  18<L>  |  5.00<H>    Ca    8.0<L>      02 Sep 2019 08:33          CAPILLARY BLOOD GLUCOSE      POCT Blood Glucose.: 152 mg/dL (02 Sep 2019 13:12)  POCT Blood Glucose.: 200 mg/dL (02 Sep 2019 11:48)  POCT Blood Glucose.: 119 mg/dL (02 Sep 2019 08:01)  POCT Blood Glucose.: 154 mg/dL (01 Sep 2019 22:02)      08-29 @ 21:14   <10,000 CFU/mL Normal Urogenital Marla  --  --  08-29 @ 17:08   No growth to date.  --  --            ----  Personally reviewed:  Vital sign trends: [ x ] yes    [  ] no     [  ] n/a  Laboratory results: [ x ] yes    [  ] no     [  ] n/a  Radiology results: [ x ] yes    [  ] no     [  ] n/a  Culture results: [ x ] yes    [  ] no     [  ] n/a  Consultant recommendations: [ x ] yes    [  ] no     [  ] n/a

## 2019-09-02 NOTE — PROGRESS NOTE ADULT - SUBJECTIVE AND OBJECTIVE BOX
Date/Time Patient Seen:  		  Referring MD:   Data Reviewed	       Patient is a 79y old  Female who presents with a chief complaint of CHF exaceration, SOB (01 Sep 2019 11:41)      Subjective/HPI     PAST MEDICAL & SURGICAL HISTORY:  CAD S/P percutaneous coronary angioplasty  Non-Hodgkins lymphoma  High cholesterol  DM (diabetes mellitus)  HTN (hypertension)  Renal failure  History of cholecystectomy  History of right nephrectomy        Medication list         MEDICATIONS  (STANDING):  amLODIPine   Tablet 10 milliGRAM(s) Oral daily  aspirin enteric coated 81 milliGRAM(s) Oral daily  atorvastatin 80 milliGRAM(s) Oral at bedtime  chlorhexidine 2% Cloths 1 Application(s) Topical daily  dextrose 5%. 1000 milliLiter(s) (50 mL/Hr) IV Continuous <Continuous>  dextrose 50% Injectable 12.5 Gram(s) IV Push once  dextrose 50% Injectable 25 Gram(s) IV Push once  dextrose 50% Injectable 25 Gram(s) IV Push once  epoetin brett Injectable 85272 Unit(s) SubCutaneous <User Schedule>  furosemide    Tablet 40 milliGRAM(s) Oral daily  hydrALAZINE 50 milliGRAM(s) Oral three times a day  insulin lispro (HumaLOG) corrective regimen sliding scale   SubCutaneous three times a day before meals  insulin lispro (HumaLOG) corrective regimen sliding scale   SubCutaneous at bedtime  metoprolol tartrate 25 milliGRAM(s) Oral two times a day  pantoprazole    Tablet 40 milliGRAM(s) Oral before breakfast  sodium bicarbonate 650 milliGRAM(s) Oral two times a day    MEDICATIONS  (PRN):  dextrose 40% Gel 15 Gram(s) Oral once PRN Blood Glucose LESS THAN 70 milliGRAM(s)/deciliter  glucagon  Injectable 1 milliGRAM(s) IntraMuscular once PRN Glucose LESS THAN 70 milligrams/deciliter         Vitals log        ICU Vital Signs Last 24 Hrs  T(C): 36.7 (02 Sep 2019 05:03), Max: 36.9 (01 Sep 2019 19:21)  T(F): 98 (02 Sep 2019 05:03), Max: 98.4 (01 Sep 2019 19:21)  HR: 60 (02 Sep 2019 05:03) (53 - 62)  BP: 125/52 (02 Sep 2019 05:03) (97/62 - 128/55)  BP(mean): --  ABP: --  ABP(mean): --  RR: 18 (02 Sep 2019 05:03) (18 - 18)  SpO2: 92% (02 Sep 2019 05:03) (91% - 96%)           Input and Output:  I&O's Detail    01 Sep 2019 07:01  -  02 Sep 2019 06:32  --------------------------------------------------------  IN:  Total IN: 0 mL    OUT:    Voided: 200 mL  Total OUT: 200 mL    Total NET: -200 mL          Lab Data                        7.9    8.32  )-----------( 300      ( 01 Sep 2019 08:25 )             26.5     09-01    144  |  113<H>  |  75<H>  ----------------------------<  119<H>  5.1   |  19<L>  |  4.70<H>    Ca    8.4<L>      01 Sep 2019 08:25    TPro  5.6<L>  /  Alb  2.3<L>  /  TBili  0.3  /  DBili  x   /  AST  23  /  ALT  29  /  AlkPhos  82  08-31            Review of Systems	      Objective     Physical Examination    heart s1s2  lung dec BS      Pertinent Lab findings & Imaging      Miguel Ángel:  NO   Adequate UO     I&O's Detail    01 Sep 2019 07:01  -  02 Sep 2019 06:32  --------------------------------------------------------  IN:  Total IN: 0 mL    OUT:    Voided: 200 mL  Total OUT: 200 mL    Total NET: -200 mL               Discussed with:     Cultures:	        Radiology

## 2019-09-02 NOTE — PROGRESS NOTE ADULT - PROBLEM SELECTOR PLAN 4
-H/H: 9.2/30.7 upon admission   -F/u repeat h/h  -Will transfuse for Hb <8.0. Fluid overloaded already secondary to acute CHF  -D/c'd Lovenox for dvt ppx and put on scd for now   -Will continue low dose aspirin for now and monitor h/h and f/u GI and Cardio recommendations   -FOBT positive as out patient, was scheduled for outpatient colonoscopy, did not yet undergo procedure  -Consult GI, Dr. Garcia called. Will probably need  coloscopy but after cardiac work up complete and cleared, per cardio here will need ischemia work up before that, may be a stress test as out patient. Outpatient follow up recommended. no plan for EGD/colon here

## 2019-09-02 NOTE — PROGRESS NOTE ADULT - SUBJECTIVE AND OBJECTIVE BOX
INTERVAL HPI/OVERNIGHT EVENTS:  pt seen and examined  c/o arm pain  denies n/v/abd pain  reports brown bm yesterday    MEDICATIONS  (STANDING):  amLODIPine   Tablet 10 milliGRAM(s) Oral daily  aspirin enteric coated 81 milliGRAM(s) Oral daily  atorvastatin 80 milliGRAM(s) Oral at bedtime  chlorhexidine 2% Cloths 1 Application(s) Topical daily  dextrose 5%. 1000 milliLiter(s) (50 mL/Hr) IV Continuous <Continuous>  dextrose 50% Injectable 12.5 Gram(s) IV Push once  dextrose 50% Injectable 25 Gram(s) IV Push once  dextrose 50% Injectable 25 Gram(s) IV Push once  epoetin brett Injectable 06952 Unit(s) SubCutaneous <User Schedule>  furosemide    Tablet 40 milliGRAM(s) Oral daily  hydrALAZINE 50 milliGRAM(s) Oral three times a day  insulin lispro (HumaLOG) corrective regimen sliding scale   SubCutaneous three times a day before meals  insulin lispro (HumaLOG) corrective regimen sliding scale   SubCutaneous at bedtime  metoprolol tartrate 25 milliGRAM(s) Oral two times a day  pantoprazole    Tablet 40 milliGRAM(s) Oral before breakfast  sodium bicarbonate 650 milliGRAM(s) Oral two times a day    MEDICATIONS  (PRN):  dextrose 40% Gel 15 Gram(s) Oral once PRN Blood Glucose LESS THAN 70 milliGRAM(s)/deciliter  glucagon  Injectable 1 milliGRAM(s) IntraMuscular once PRN Glucose LESS THAN 70 milligrams/deciliter      Allergies    No Known Allergies    Intolerances        Review of Systems:    General:  No wt loss, fevers, chills, night sweats, fatigue   Eyes:  Good vision, no reported pain  ENT:  No sore throat, pain, runny nose, dysphagia  CV:  No pain, palpitations, hypo/hypertension  Resp:  No dyspnea, cough, tachypnea, wheezing  GI:  No pain, No nausea, No vomiting, No diarrhea, No constipation, No weight loss, No fever, No pruritis, No rectal bleeding, No melena, No dysphagia  :  No pain, bleeding, incontinence, nocturia  Muscle:  arm pain  Neuro:  No weakness, tingling, memory problems  Psych:  No fatigue, insomnia, mood problems, depression  Endocrine:  No polyuria, polydypsia, cold/heat intolerance  Heme:  No petechiae, ecchymosis, easy bruisability  Skin:  No rash, tattoos, scars, edema      Vital Signs Last 24 Hrs  T(C): 36.7 (02 Sep 2019 08:19), Max: 36.9 (01 Sep 2019 19:21)  T(F): 98 (02 Sep 2019 08:19), Max: 98.4 (01 Sep 2019 19:21)  HR: 50 (02 Sep 2019 08:19) (50 - 62)  BP: 106/53 (02 Sep 2019 08:19) (97/62 - 127/58)  BP(mean): --  RR: 18 (02 Sep 2019 08:19) (18 - 18)  SpO2: 92% (02 Sep 2019 08:19) (91% - 96%)    PHYSICAL EXAM:    General:  nad  HEENT:  NC/AT  Chest:  dec bs  Cardiovascular:  Regular rhythm, S1, S2  Abdomen:  Soft, non-tender, mild dt  Extremities:  no  edema  Skin:  No rash  Neuro/Psych:  A&Ox3        LABS:                        7.6    9.37  )-----------( 284      ( 02 Sep 2019 08:33 )             25.6     09-02    143  |  113<H>  |  82<H>  ----------------------------<  121<H>  4.9   |  18<L>  |  5.00<H>    Ca    8.0<L>      02 Sep 2019 08:33            RADIOLOGY & ADDITIONAL TESTS:

## 2019-09-02 NOTE — PROGRESS NOTE ADULT - PROBLEM SELECTOR PLAN 3
-baseline creatinine ranges from 3-4, CKD 5  -acute on chronic renal failure  -Renal diet, fluid restriction to 2 L  -monitor renal indices  -Nephro consulted, f/u recs  -avoid nephrotoxic meds  -pt is hemodynamically stable for outpatient f/u but await renal function to plateau prior to d/c home

## 2019-09-02 NOTE — PROGRESS NOTE ADULT - PROBLEM SELECTOR PLAN 1
multifactorial  fobt+  hgb trend noted, no evidence of active gib  passing brown stool  monitor cbc, transfuse prn  cont ppi ppx, epo  will need further gi w/u w private gi as op once undergoes ischemic eval/cleared by cardiology  will follow

## 2019-09-02 NOTE — PROGRESS NOTE ADULT - PROBLEM SELECTOR PLAN 1
seen and examined  vs and meds reviewed  medical follow up notes reviewed  pt is now on PO LASIX and Sodium Bicarbonate - am Cr pending  LASIX PO daily -   on o2 support  family at the bedside  CHF - valvular heart disease and CKD -   US chest reviewed - small effusions  Cardio and renal following  cont to monitor renal indices  will follow  prognosis guarded  check sat at rest and on exertion.

## 2019-09-02 NOTE — PROGRESS NOTE ADULT - SUBJECTIVE AND OBJECTIVE BOX
[facility] -- Flaca Summers, Grupo, Missael, Adan Heredia Savella  Office # [phone]    Follow Up: CHF Exacerbation       HPI:  Pt is a 80 yo F w/ PMH Non-Hodgkins lymphoma, High cholesterol, DM (on insulin),HTN, chronic Renal failure, angioplasty 2009, removal of R kidney and gall bladder 2011, chemo 2011 that presents to [facility] ED c/o SOB for the past 3-4 days. She explains that she has been traveling recently. She just got back from [place]. Her shortness of breath began during her trip. She explains she has never had this before. She was having trouble walking long distances before becoming out of breath and needing to stop walking. She rates the SOB 7/10. She also admits to leg swelling, LE spasms/pain, orthopnea and palpations. She recently had a positive FOBT outpatient and was scheduled for a colonoscopy but has yet to receive it. She denies abdominal pain, chest pain, dyspnea, numbness, tingling, syncope, dizziness.     In ED /60, HR 88, SPO2 98 on room air. No leukocytosis, afebrile.   BUN 51, Cr 3.7, Lactate 1.3, BNP 16,687, trop .084, H/H 9.2/30.7  EKG: Sinus rhythm, PVCs, PACs. LVH. LA enlargement.   CXR: small b/l pleural effusions and bibasilar atelectasis. (29 Aug 2019 14:55)      Subjective/Observations:     Pt. seen examined and evaluated. Pt. resting comfortably in bed in NAD, with no respiratory distress, no chest pain, dyspnea, palpitations, PND or orthopnea     REVIEW OF SYSTEMS: All other review of systems is negative unless indicated above    PAST MEDICAL & SURGICAL HISTORY:  CAD S/P percutaneous coronary angioplasty  Non-Hodgkins lymphoma  High cholesterol  DM (diabetes mellitus)  HTN (hypertension)  Renal failure  History of cholecystectomy  History of right nephrectomy      MEDICATIONS  (STANDING):  amLODIPine   Tablet 10 milliGRAM(s) Oral daily  aspirin enteric coated 81 milliGRAM(s) Oral daily  atorvastatin 80 milliGRAM(s) Oral at bedtime  chlorhexidine 2% Cloths 1 Application(s) Topical daily  dextrose 5%. 1000 milliLiter(s) (50 mL/Hr) IV Continuous <Continuous>  dextrose 50% Injectable 12.5 Gram(s) IV Push once  dextrose 50% Injectable 25 Gram(s) IV Push once  dextrose 50% Injectable 25 Gram(s) IV Push once  epoetin brett Injectable 24082 Unit(s) SubCutaneous <User Schedule>  furosemide    Tablet 40 milliGRAM(s) Oral daily  hydrALAZINE 50 milliGRAM(s) Oral three times a day  insulin lispro (HumaLOG) corrective regimen sliding scale   SubCutaneous three times a day before meals  insulin lispro (HumaLOG) corrective regimen sliding scale   SubCutaneous at bedtime  iron sucrose Injectable 100 milliGRAM(s) IV Push <User Schedule>  metoprolol tartrate 25 milliGRAM(s) Oral two times a day  pantoprazole    Tablet 40 milliGRAM(s) Oral before breakfast    MEDICATIONS  (PRN):  dextrose 40% Gel 15 Gram(s) Oral once PRN Blood Glucose LESS THAN 70 milliGRAM(s)/deciliter  glucagon  Injectable 1 milliGRAM(s) IntraMuscular once PRN Glucose LESS THAN 70 milligrams/deciliter      Allergies    No Known Allergies    Intolerances        Vital Signs Last 24 Hrs  T(C): 36.7 (01 Sep 2019 07:36), Max: 37 (31 Aug 2019 23:28)  T(F): 98.1 (01 Sep 2019 07:36), Max: 98.6 (31 Aug 2019 23:28)  HR: 53 (01 Sep 2019 07:36) (53 - 64)  BP: 128/55 (01 Sep 2019 07:36) (120/58 - 144/60)  BP(mean): --  RR: 18 (01 Sep 2019 07:36) (17 - 18)  SpO2: 95% (01 Sep 2019 07:36) (92% - 98%)    I&O's Summary    01 Sep 2019 07:01  -  01 Sep 2019 08:15  --------------------------------------------------------  IN: 0 mL / OUT: 200 mL / NET: -200 mL          PHYSICAL EXAM:    Constitutional: NAD, awake and alert, well-developed  HEENT: Moist Mucous Membranes, Anicteric  Pulmonary: Non-labored, breath sounds are clear bilaterally, No wheezing, rales or rhonchi  Cardiovascular: Regular, S1 and S2, No murmurs, rubs, gallops or clicks  Gastrointestinal: Bowel Sounds present, soft, nontender.   Lymph: No peripheral edema. No lymphadenopathy.  Skin: No visible rashes or ulcers.  Psych:  Mood & affect appropriate    LABS: All Labs Reviewed:        Interpretation of Telemetry: Overnight on telemetry SB 50-60       ECHO:   < from: TTE Echo Doppler w/o Cont (08.29.19 @ 16:00) >  OBSERVATIONS:    Mitral Valve: normal, mild to moderate MR.  Aortic Valve/Aorta: Calcified trileaflet aortic valve with decreased   opening. Aortic valve area is calculated to be 1.2 sq cm, consistent with   moderate aortic stenosis  Tricuspid Valve: normal with moderate TR.  Pulmonic Valve: normal  Left Atrium: Enlarged  Right Atrium: Enlarged  Left Ventricle: normal LV size and systolic function, estimated LVEF of   65%.  Right Ventricle: Grossly normal size and systolic function.  Pericardium/Pleura: normal, no significant pericardial effusion.  Pulmonary/RV Pressure: estimated PA systolic pressure of 52 mmHg   LV diastolic dysfunction is present  Large bilateral pleural effusions    Conclusion:     Normal left ventricular internal dimensions and systolic function,   estimated LVEF of 65%.   Grossly normal RV size and systolic function.   Biatrial enlargement  Calcified trileaflet aortic valve with decreased opening. Aortic valve   area is calculated to be 1.2 sq cm, consistent with moderate aortic   stenosis  Mild to moderate MR   Moderate TR.    Estimated PA systolic pressure of 52 mmHg.    No significant pericardial effusion.  Large bilateral pleural effusions          Imaging:

## 2019-09-03 ENCOUNTER — TRANSCRIPTION ENCOUNTER (OUTPATIENT)
Age: 79
End: 2019-09-03

## 2019-09-03 VITALS — DIASTOLIC BLOOD PRESSURE: 79 MMHG | SYSTOLIC BLOOD PRESSURE: 116 MMHG

## 2019-09-03 LAB
ALBUMIN SERPL ELPH-MCNC: 2.5 G/DL — LOW (ref 3.3–5)
ALP SERPL-CCNC: 87 U/L — SIGNIFICANT CHANGE UP (ref 40–120)
ALT FLD-CCNC: 28 U/L — SIGNIFICANT CHANGE UP (ref 12–78)
ANION GAP SERPL CALC-SCNC: 13 MMOL/L — SIGNIFICANT CHANGE UP (ref 5–17)
AST SERPL-CCNC: 24 U/L — SIGNIFICANT CHANGE UP (ref 15–37)
BILIRUB DIRECT SERPL-MCNC: 0.1 MG/DL — SIGNIFICANT CHANGE UP (ref 0.05–0.2)
BILIRUB INDIRECT FLD-MCNC: 0.2 MG/DL — SIGNIFICANT CHANGE UP (ref 0.2–1)
BILIRUB SERPL-MCNC: 0.3 MG/DL — SIGNIFICANT CHANGE UP (ref 0.2–1.2)
BUN SERPL-MCNC: 88 MG/DL — HIGH (ref 7–23)
CALCIUM SERPL-MCNC: 8.2 MG/DL — LOW (ref 8.5–10.1)
CHLORIDE SERPL-SCNC: 110 MMOL/L — HIGH (ref 96–108)
CO2 SERPL-SCNC: 19 MMOL/L — LOW (ref 22–31)
CREAT SERPL-MCNC: 5.1 MG/DL — HIGH (ref 0.5–1.3)
CULTURE RESULTS: SIGNIFICANT CHANGE UP
CULTURE RESULTS: SIGNIFICANT CHANGE UP
GLUCOSE SERPL-MCNC: 128 MG/DL — HIGH (ref 70–99)
HCT VFR BLD CALC: 26 % — LOW (ref 34.5–45)
HGB BLD-MCNC: 7.8 G/DL — LOW (ref 11.5–15.5)
INR BLD: 1.24 RATIO — HIGH (ref 0.88–1.16)
MAGNESIUM SERPL-MCNC: 1.9 MG/DL — SIGNIFICANT CHANGE UP (ref 1.6–2.6)
MCHC RBC-ENTMCNC: 27.4 PG — SIGNIFICANT CHANGE UP (ref 27–34)
MCHC RBC-ENTMCNC: 30 GM/DL — LOW (ref 32–36)
MCV RBC AUTO: 91.2 FL — SIGNIFICANT CHANGE UP (ref 80–100)
NRBC # BLD: 0 /100 WBCS — SIGNIFICANT CHANGE UP (ref 0–0)
PHOSPHATE SERPL-MCNC: 6.3 MG/DL — HIGH (ref 2.5–4.5)
PLATELET # BLD AUTO: 297 K/UL — SIGNIFICANT CHANGE UP (ref 150–400)
POTASSIUM SERPL-MCNC: 4.9 MMOL/L — SIGNIFICANT CHANGE UP (ref 3.5–5.3)
POTASSIUM SERPL-SCNC: 4.9 MMOL/L — SIGNIFICANT CHANGE UP (ref 3.5–5.3)
PROT SERPL-MCNC: 6.2 G/DL — SIGNIFICANT CHANGE UP (ref 6–8.3)
PROTHROM AB SERPL-ACNC: 14.1 SEC — HIGH (ref 10–12.9)
RBC # BLD: 2.85 M/UL — LOW (ref 3.8–5.2)
RBC # FLD: 19.5 % — HIGH (ref 10.3–14.5)
SODIUM SERPL-SCNC: 142 MMOL/L — SIGNIFICANT CHANGE UP (ref 135–145)
SPECIMEN SOURCE: SIGNIFICANT CHANGE UP
SPECIMEN SOURCE: SIGNIFICANT CHANGE UP
WBC # BLD: 8.92 K/UL — SIGNIFICANT CHANGE UP (ref 3.8–10.5)
WBC # FLD AUTO: 8.92 K/UL — SIGNIFICANT CHANGE UP (ref 3.8–10.5)

## 2019-09-03 PROCEDURE — 80048 BASIC METABOLIC PNL TOTAL CA: CPT

## 2019-09-03 PROCEDURE — 96374 THER/PROPH/DIAG INJ IV PUSH: CPT

## 2019-09-03 PROCEDURE — 87040 BLOOD CULTURE FOR BACTERIA: CPT

## 2019-09-03 PROCEDURE — 85730 THROMBOPLASTIN TIME PARTIAL: CPT

## 2019-09-03 PROCEDURE — C8929: CPT

## 2019-09-03 PROCEDURE — 80053 COMPREHEN METABOLIC PANEL: CPT

## 2019-09-03 PROCEDURE — 93306 TTE W/DOPPLER COMPLETE: CPT

## 2019-09-03 PROCEDURE — 82962 GLUCOSE BLOOD TEST: CPT

## 2019-09-03 PROCEDURE — 84484 ASSAY OF TROPONIN QUANT: CPT

## 2019-09-03 PROCEDURE — 85018 HEMOGLOBIN: CPT

## 2019-09-03 PROCEDURE — 99239 HOSP IP/OBS DSCHRG MGMT >30: CPT

## 2019-09-03 PROCEDURE — 82728 ASSAY OF FERRITIN: CPT

## 2019-09-03 PROCEDURE — 84100 ASSAY OF PHOSPHORUS: CPT

## 2019-09-03 PROCEDURE — 83540 ASSAY OF IRON: CPT

## 2019-09-03 PROCEDURE — 87086 URINE CULTURE/COLONY COUNT: CPT

## 2019-09-03 PROCEDURE — 36415 COLL VENOUS BLD VENIPUNCTURE: CPT

## 2019-09-03 PROCEDURE — 86901 BLOOD TYPING SEROLOGIC RH(D): CPT

## 2019-09-03 PROCEDURE — 82310 ASSAY OF CALCIUM: CPT

## 2019-09-03 PROCEDURE — 80076 HEPATIC FUNCTION PANEL: CPT

## 2019-09-03 PROCEDURE — 99232 SBSQ HOSP IP/OBS MODERATE 35: CPT

## 2019-09-03 PROCEDURE — 82553 CREATINE MB FRACTION: CPT

## 2019-09-03 PROCEDURE — 83880 ASSAY OF NATRIURETIC PEPTIDE: CPT

## 2019-09-03 PROCEDURE — 86850 RBC ANTIBODY SCREEN: CPT

## 2019-09-03 PROCEDURE — 83036 HEMOGLOBIN GLYCOSYLATED A1C: CPT

## 2019-09-03 PROCEDURE — 93005 ELECTROCARDIOGRAM TRACING: CPT

## 2019-09-03 PROCEDURE — 82272 OCCULT BLD FECES 1-3 TESTS: CPT

## 2019-09-03 PROCEDURE — 83605 ASSAY OF LACTIC ACID: CPT

## 2019-09-03 PROCEDURE — 93971 EXTREMITY STUDY: CPT

## 2019-09-03 PROCEDURE — 80061 LIPID PANEL: CPT

## 2019-09-03 PROCEDURE — 83550 IRON BINDING TEST: CPT

## 2019-09-03 PROCEDURE — 85610 PROTHROMBIN TIME: CPT

## 2019-09-03 PROCEDURE — 93971 EXTREMITY STUDY: CPT | Mod: 26,RT

## 2019-09-03 PROCEDURE — 99285 EMERGENCY DEPT VISIT HI MDM: CPT | Mod: 25

## 2019-09-03 PROCEDURE — 71045 X-RAY EXAM CHEST 1 VIEW: CPT

## 2019-09-03 PROCEDURE — 85027 COMPLETE CBC AUTOMATED: CPT

## 2019-09-03 PROCEDURE — 83735 ASSAY OF MAGNESIUM: CPT

## 2019-09-03 PROCEDURE — 83970 ASSAY OF PARATHORMONE: CPT

## 2019-09-03 PROCEDURE — 82550 ASSAY OF CK (CPK): CPT

## 2019-09-03 PROCEDURE — 85014 HEMATOCRIT: CPT

## 2019-09-03 PROCEDURE — 86900 BLOOD TYPING SEROLOGIC ABO: CPT

## 2019-09-03 PROCEDURE — 81001 URINALYSIS AUTO W/SCOPE: CPT

## 2019-09-03 PROCEDURE — 76604 US EXAM CHEST: CPT

## 2019-09-03 RX ORDER — FUROSEMIDE 40 MG
2 TABLET ORAL
Qty: 20 | Refills: 0
Start: 2019-09-03 | End: 2019-09-12

## 2019-09-03 RX ORDER — PANTOPRAZOLE SODIUM 20 MG/1
1 TABLET, DELAYED RELEASE ORAL
Qty: 0 | Refills: 0 | DISCHARGE
Start: 2019-09-03

## 2019-09-03 RX ORDER — FUROSEMIDE 40 MG
1 TABLET ORAL
Qty: 0 | Refills: 0 | DISCHARGE

## 2019-09-03 RX ORDER — PANTOPRAZOLE SODIUM 20 MG/1
1 TABLET, DELAYED RELEASE ORAL
Qty: 15 | Refills: 0
Start: 2019-09-03 | End: 2019-09-17

## 2019-09-03 RX ORDER — METOPROLOL TARTRATE 50 MG
1 TABLET ORAL
Qty: 24 | Refills: 0
Start: 2019-09-03 | End: 2019-09-14

## 2019-09-03 RX ORDER — SODIUM BICARBONATE 1 MEQ/ML
1 SYRINGE (ML) INTRAVENOUS
Qty: 20 | Refills: 0
Start: 2019-09-03 | End: 2019-09-12

## 2019-09-03 RX ORDER — METOPROLOL TARTRATE 50 MG
1 TABLET ORAL
Qty: 0 | Refills: 0 | DISCHARGE
Start: 2019-09-03

## 2019-09-03 RX ORDER — FUROSEMIDE 40 MG
2 TABLET ORAL
Qty: 0 | Refills: 0 | DISCHARGE

## 2019-09-03 RX ORDER — SODIUM BICARBONATE 1 MEQ/ML
1 SYRINGE (ML) INTRAVENOUS
Qty: 0 | Refills: 0 | DISCHARGE
Start: 2019-09-03

## 2019-09-03 RX ADMIN — AMLODIPINE BESYLATE 10 MILLIGRAM(S): 2.5 TABLET ORAL at 05:09

## 2019-09-03 RX ADMIN — Medication 40 MILLIGRAM(S): at 05:09

## 2019-09-03 RX ADMIN — Medication 81 MILLIGRAM(S): at 12:46

## 2019-09-03 RX ADMIN — Medication 50 MILLIGRAM(S): at 05:08

## 2019-09-03 RX ADMIN — PANTOPRAZOLE SODIUM 40 MILLIGRAM(S): 20 TABLET, DELAYED RELEASE ORAL at 05:08

## 2019-09-03 RX ADMIN — Medication 2: at 12:46

## 2019-09-03 RX ADMIN — Medication 650 MILLIGRAM(S): at 05:09

## 2019-09-03 RX ADMIN — Medication 50 MILLIGRAM(S): at 14:50

## 2019-09-03 NOTE — PROGRESS NOTE ADULT - SUBJECTIVE AND OBJECTIVE BOX
Date/Time Patient Seen:  		  Referring MD:   Data Reviewed	       Patient is a 79y old  Female who presents with a chief complaint of CHF exaceration, SOB (02 Sep 2019 16:48)      Subjective/HPI     PAST MEDICAL & SURGICAL HISTORY:  CAD S/P percutaneous coronary angioplasty  Non-Hodgkins lymphoma  High cholesterol  DM (diabetes mellitus)  HTN (hypertension)  Renal failure  History of cholecystectomy  History of right nephrectomy        Medication list         MEDICATIONS  (STANDING):  amLODIPine   Tablet 10 milliGRAM(s) Oral daily  aspirin enteric coated 81 milliGRAM(s) Oral daily  atorvastatin 80 milliGRAM(s) Oral at bedtime  chlorhexidine 2% Cloths 1 Application(s) Topical daily  dextrose 5%. 1000 milliLiter(s) (50 mL/Hr) IV Continuous <Continuous>  dextrose 50% Injectable 12.5 Gram(s) IV Push once  dextrose 50% Injectable 25 Gram(s) IV Push once  dextrose 50% Injectable 25 Gram(s) IV Push once  epoetin brett Injectable 66485 Unit(s) SubCutaneous <User Schedule>  furosemide    Tablet 40 milliGRAM(s) Oral daily  hydrALAZINE 50 milliGRAM(s) Oral three times a day  insulin lispro (HumaLOG) corrective regimen sliding scale   SubCutaneous three times a day before meals  insulin lispro (HumaLOG) corrective regimen sliding scale   SubCutaneous at bedtime  metoprolol tartrate 25 milliGRAM(s) Oral two times a day  pantoprazole    Tablet 40 milliGRAM(s) Oral before breakfast  sodium bicarbonate 650 milliGRAM(s) Oral two times a day    MEDICATIONS  (PRN):  dextrose 40% Gel 15 Gram(s) Oral once PRN Blood Glucose LESS THAN 70 milliGRAM(s)/deciliter  glucagon  Injectable 1 milliGRAM(s) IntraMuscular once PRN Glucose LESS THAN 70 milligrams/deciliter         Vitals log        ICU Vital Signs Last 24 Hrs  T(C): 37.1 (03 Sep 2019 04:38), Max: 37.1 (02 Sep 2019 11:45)  T(F): 98.7 (03 Sep 2019 04:38), Max: 98.7 (02 Sep 2019 11:45)  HR: 62 (03 Sep 2019 04:38) (50 - 62)  BP: 132/57 (03 Sep 2019 04:38) (94/60 - 135/64)  BP(mean): --  ABP: --  ABP(mean): --  RR: 17 (03 Sep 2019 04:38) (17 - 19)  SpO2: 92% (03 Sep 2019 04:38) (92% - 96%)           Input and Output:  I&O's Detail    01 Sep 2019 07:01  -  02 Sep 2019 07:00  --------------------------------------------------------  IN:  Total IN: 0 mL    OUT:    Voided: 200 mL  Total OUT: 200 mL    Total NET: -200 mL      02 Sep 2019 07:01  -  03 Sep 2019 06:26  --------------------------------------------------------  IN:    Oral Fluid: 200 mL  Total IN: 200 mL    OUT:    Voided: 250 mL  Total OUT: 250 mL    Total NET: -50 mL          Lab Data                        7.6    9.37  )-----------( 284      ( 02 Sep 2019 08:33 )             25.6     09-02    143  |  113<H>  |  82<H>  ----------------------------<  121<H>  4.9   |  18<L>  |  5.00<H>    Ca    8.0<L>      02 Sep 2019 08:33              Review of Systems	      Objective     Physical Examination    heart s1s2  lung dec BS  abd soft      Pertinent Lab findings & Imaging      Miguel Ángel:  NO   Adequate UO     I&O's Detail    01 Sep 2019 07:01  -  02 Sep 2019 07:00  --------------------------------------------------------  IN:  Total IN: 0 mL    OUT:    Voided: 200 mL  Total OUT: 200 mL    Total NET: -200 mL      02 Sep 2019 07:01  -  03 Sep 2019 06:26  --------------------------------------------------------  IN:    Oral Fluid: 200 mL  Total IN: 200 mL    OUT:    Voided: 250 mL  Total OUT: 250 mL    Total NET: -50 mL               Discussed with:     Cultures:	        Radiology

## 2019-09-03 NOTE — DISCHARGE NOTE NURSING/CASE MANAGEMENT/SOCIAL WORK - PATIENT PORTAL LINK FT
You can access the FollowMyHealth Patient Portal offered by [facility] by registering at the following website: [email or web address]. By joining [email or web address]’s FollowMyHealth portal, you will also be able to view your health information using other applications (apps) compatible with our system.

## 2019-09-03 NOTE — PROGRESS NOTE ADULT - PROBLEM SELECTOR PROBLEM 1
Pleural effusion
CHF exacerbation
Pleural effusion
Anemia
CHF exacerbation

## 2019-09-03 NOTE — PROGRESS NOTE ADULT - PROBLEM SELECTOR PLAN 1
multifactorial  fobt+  hgb trend noted, low but stable  no evidence of active gib  monitor cbc, transfuse prn  cont ppi ppx, epo  will need further gi w/u w private gi as op once undergoes ischemic eval/cleared by cardiology  will follow

## 2019-09-03 NOTE — PROGRESS NOTE ADULT - SUBJECTIVE AND OBJECTIVE BOX
[facility] -- Flaca Summers, Missael Lombardo, Adan Heredia Savella  Office # [phone]    Follow Up:  ADHF    Subjective/Observations: Awake and alert, comfortable on RA.  Denies MO or chest discomfort.  c/o right arm pain form IV infiltration    REVIEW OF SYSTEMS: All other review of systems is negative unless indicated above  PAST MEDICAL & SURGICAL HISTORY:  CAD S/P percutaneous coronary angioplasty  Non-Hodgkins lymphoma  High cholesterol  DM (diabetes mellitus)  HTN (hypertension)  Renal failure  History of cholecystectomy  History of right nephrectomy    MEDICATIONS  (STANDING):  amLODIPine   Tablet 10 milliGRAM(s) Oral daily  aspirin enteric coated 81 milliGRAM(s) Oral daily  atorvastatin 80 milliGRAM(s) Oral at bedtime  chlorhexidine 2% Cloths 1 Application(s) Topical daily  dextrose 5%. 1000 milliLiter(s) (50 mL/Hr) IV Continuous <Continuous>  dextrose 50% Injectable 12.5 Gram(s) IV Push once  dextrose 50% Injectable 25 Gram(s) IV Push once  dextrose 50% Injectable 25 Gram(s) IV Push once  epoetin brett Injectable 09818 Unit(s) SubCutaneous <User Schedule>  furosemide    Tablet 40 milliGRAM(s) Oral daily  hydrALAZINE 50 milliGRAM(s) Oral three times a day  insulin lispro (HumaLOG) corrective regimen sliding scale   SubCutaneous three times a day before meals  insulin lispro (HumaLOG) corrective regimen sliding scale   SubCutaneous at bedtime  metoprolol tartrate 25 milliGRAM(s) Oral two times a day  pantoprazole    Tablet 40 milliGRAM(s) Oral before breakfast  sodium bicarbonate 650 milliGRAM(s) Oral two times a day    MEDICATIONS  (PRN):  dextrose 40% Gel 15 Gram(s) Oral once PRN Blood Glucose LESS THAN 70 milliGRAM(s)/deciliter  glucagon  Injectable 1 milliGRAM(s) IntraMuscular once PRN Glucose LESS THAN 70 milligrams/deciliter    Allergies    No Known Allergies    Intolerances    Vital Signs Last 24 Hrs  T(C): 36.6 (03 Sep 2019 07:47), Max: 37.1 (02 Sep 2019 11:45)  T(F): 97.9 (03 Sep 2019 07:47), Max: 98.7 (02 Sep 2019 11:45)  HR: 62 (03 Sep 2019 07:47) (54 - 62)  BP: 123/54 (03 Sep 2019 07:47) (94/60 - 135/64)  BP(mean): --  RR: 18 (03 Sep 2019 07:47) (17 - 19)  SpO2: 93% (03 Sep 2019 07:47) (92% - 96%)  I&O's Summary    02 Sep 2019 07:01  -  03 Sep 2019 07:00  --------------------------------------------------------  IN: 200 mL / OUT: 250 mL / NET: -50 mL    PHYSICAL EXAM:  TELE: SB  Constitutional: NAD, awake and alert, well-developed  HEENT: Moist Mucous Membranes, Anicteric  Pulmonary: Non-labored, breath sounds are diminished at bases bilaterally, No wheezing or rhonchi.  fine rales on right base  Cardiovascular: Regular, S1 and S2, +murmurs.  No rubs, gallops or clicks  Gastrointestinal: Bowel Sounds present, soft, nontender.   Lymph: No peripheral edema. No lymphadenopathy.  Skin: No visible rashes or ulcers.  Psych:  Mood & affect appropriate  LABS: All Labs Reviewed:                        7.8    8.92  )-----------( 297      ( 03 Sep 2019 06:25 )             26.0                         7.6    9.37  )-----------( 284      ( 02 Sep 2019 08:33 )             25.6                         7.9    8.32  )-----------( 300      ( 01 Sep 2019 08:25 )             26.5     03 Sep 2019 06:25    142    |  110    |  88     ----------------------------<  128    4.9     |  19     |  5.10   02 Sep 2019 08:33    143    |  113    |  82     ----------------------------<  121    4.9     |  18     |  5.00   01 Sep 2019 08:25    144    |  113    |  75     ----------------------------<  119    5.1     |  19     |  4.70     Ca    8.2        03 Sep 2019 06:25  Ca    8.0        02 Sep 2019 08:33  Ca    8.4        01 Sep 2019 08:25  Phos  6.3       03 Sep 2019 06:25  Mg     1.9       03 Sep 2019 06:25    TPro  6.2    /  Alb  2.5    /  TBili  0.3    /  DBili  .10    /  AST  24     /  ALT  28     /  AlkPhos  87     03 Sep 2019 06:25    PT/INR - ( 03 Sep 2019 06:25 )   PT: 14.1 sec;   INR: 1.24 ratio      < from: TTE Echo Doppler w/o Cont (08.29.19 @ 16:00) >     EXAM:  ECHO TTE WO CON COMP W DOPPLR         PROCEDURE DATE:  08/29/2019        INTERPRETATION:  INDICATION: Dyspnea    Blood Pressure 170/76    Height 154 cm     Weight 53.5 kg       BSA 1.5   sq m    Dimensions:    LA 4.1       Normal Values: 2.0 - 4.0 cm    Ao 3.0        Normal Values: 2.0 - 3.8 cm  SEPTUM 0.8       Normal Values: 0.6 - 1.2 cm  PWT 0.8       Normal Values: 0.6 - 1.1 cm  LVIDd 4.6         Normal Values: 3.0 - 5.6 cm  LVIDs 3.7         Normal Values: 1.8 - 4.0 cm      OBSERVATIONS:    Mitral Valve: normal, mild to moderate MR.  Aortic Valve/Aorta: Calcified trileaflet aortic valve with decreased   opening. Aortic valve area is calculated to be 1.2 sq cm, consistent with   moderate aortic stenosis  Tricuspid Valve: normal with moderate TR.  Pulmonic Valve: normal  Left Atrium: Enlarged  Right Atrium: Enlarged  Left Ventricle: normal LV size and systolic function, estimated LVEF of   65%.  Right Ventricle: Grossly normal size and systolic function.  Pericardium/Pleura: normal, no significant pericardial effusion.  Pulmonary/RV Pressure: estimated PA systolic pressure of 52 mmHg   LV diastolic dysfunction is present  Large bilateral pleural effusions    Conclusion:     Normal left ventricular internal dimensions and systolic function,   estimated LVEF of 65%.   Grossly normal RV size and systolic function.   Biatrial enlargement  Calcified trileaflet aortic valve with decreased opening. Aortic valve   area is calculated to be 1.2 sq cm, consistent with moderate aortic   stenosis  Mild to moderate MR   Moderate TR.    Estimated PA systolic pressure of 52 mmHg.    No significant pericardial effusion.  Large bilateral pleural effusions      YAYO ROLANDKITTY   This document has been electronically signed. Aug 30 2019  8:25AM      < end of copied text >    < from: Xray Chest 1 View- PORTABLE-Urgent (08.29.19 @ 10:18) >    EXAM:  XR CHEST PORTABLE URGENT 1V                          PROCEDURE DATE:  08/29/2019      INTERPRETATION:  CLINICAL INFORMATION: Shortness of breath.    TECHNIQUE: AP portable view of the chest    COMPARISON: 1/5/2012    FINDINGS:    The cardiac silhouette is enlarged. The aorta is tortuous and   atherosclerotic. There are small bilateral pleural effusions and   bibasilar atelectasis, left greater than right. There is no pneumothorax.     IMPRESSION:     Small bilateral pleural effusions and bibasilar atelectasis.     ANA PAULA KELLER M.D., ATTENDING RADIOLOGIST  This document has been electronically signed. Aug 29 2019 10:21AM      < end of copied text >    < from: 12 Lead ECG (08.30.19 @ 07:35) >    Ventricular Rate 75 BPM    Atrial Rate 75 BPM    P-R Interval 184 ms    QRS Duration 84 ms    Q-T Interval 414 ms    QTC Calculation(Bezet) 462 ms    P Axis 35 degrees    R Axis 7 degrees    T Axis 67 degrees    Diagnosis Line Normal sinus rhythm  poss  Septal infarct , age undetermined  Nonspecific ST and T wave abnormality  Abnormal ECG  Confirmed by Grupo BUCHANAN, Raj ([id number] on 8/30/2019 12:34:12 PM    < end of copied text >

## 2019-09-03 NOTE — PROGRESS NOTE ADULT - ATTENDING COMMENTS
The tx plan was discussed in detail with the patient and family members at the bedside. Their questions and concerns were addressed to the best of my ability. They are in agreement with the plan as detailed above. They demonstrated adequate understanding of the counseling which I have provided.
Chart reviewed    Patient seen and examined    Agree with plan as outlined above
I personally saw and examined the patient in detail.  I have spoken to the above provider regarding the assessment and plan of care.  I reviewed the above assessment and plan of care, and agree.  I have made changes in the body of the note where appropriate.
Chart reviewed    Patient seen and examined     Agree with plan as outlined
Advanced care planning was discussed with patient and family.  Advanced care planning forms were reviewed and discussed.  Risks, benefits and alternatives of gastroenterologic procedures were discussed in detail and all questions were answered.    30 minutes spent.
Continue IV Lasix  F/u TTE results  Cardio f/u  GI consult for possible inpatient Coloscopy once stable from cardiac stand point  Monitor renal function on IV Lasix. Nephro following  D/c Lovenox for DVT PPX   SCD  Ambulate as tolerated  Monitor on Tele.   D/w Patient, RN
Continue IV Lasix  Monitor renal function  Cardio/ Nephro/ GI f/u.  Monitor h/h. No s/s of active bleeding. Will need Colonoscopy once cardiac work up is complete and optimized.
trend off O2, dc planning

## 2019-09-03 NOTE — PROGRESS NOTE ADULT - SUBJECTIVE AND OBJECTIVE BOX
INTERVAL HPI/OVERNIGHT EVENTS:  pt seen and examined  offers no gi complaints  denies melena and brbpr    MEDICATIONS  (STANDING):  amLODIPine   Tablet 10 milliGRAM(s) Oral daily  aspirin enteric coated 81 milliGRAM(s) Oral daily  atorvastatin 80 milliGRAM(s) Oral at bedtime  chlorhexidine 2% Cloths 1 Application(s) Topical daily  dextrose 5%. 1000 milliLiter(s) (50 mL/Hr) IV Continuous <Continuous>  dextrose 50% Injectable 12.5 Gram(s) IV Push once  dextrose 50% Injectable 25 Gram(s) IV Push once  dextrose 50% Injectable 25 Gram(s) IV Push once  epoetin brett Injectable 64293 Unit(s) SubCutaneous <User Schedule>  furosemide    Tablet 40 milliGRAM(s) Oral daily  hydrALAZINE 50 milliGRAM(s) Oral three times a day  insulin lispro (HumaLOG) corrective regimen sliding scale   SubCutaneous three times a day before meals  insulin lispro (HumaLOG) corrective regimen sliding scale   SubCutaneous at bedtime  metoprolol tartrate 25 milliGRAM(s) Oral two times a day  pantoprazole    Tablet 40 milliGRAM(s) Oral before breakfast  sodium bicarbonate 650 milliGRAM(s) Oral two times a day    MEDICATIONS  (PRN):  dextrose 40% Gel 15 Gram(s) Oral once PRN Blood Glucose LESS THAN 70 milliGRAM(s)/deciliter  glucagon  Injectable 1 milliGRAM(s) IntraMuscular once PRN Glucose LESS THAN 70 milligrams/deciliter      Allergies    No Known Allergies    Intolerances        Review of Systems:    General:  No wt loss, fevers, chills, night sweats, fatigue   Eyes:  Good vision, no reported pain  ENT:  No sore throat, pain, runny nose, dysphagia  CV:  No pain, palpitations, hypo/hypertension  Resp:  No dyspnea, cough, tachypnea, wheezing  GI:  No pain, No nausea, No vomiting, No diarrhea, No constipation, No weight loss, No fever, No pruritis, No rectal bleeding, No melena, No dysphagia  :  No pain, bleeding, incontinence, nocturia  Muscle:  No pain, weakness  Neuro:  No weakness, tingling, memory problems  Psych:  No fatigue, insomnia, mood problems, depression  Endocrine:  No polyuria, polydypsia, cold/heat intolerance  Heme:  No petechiae, ecchymosis, easy bruisability  Skin:  No rash, tattoos, scars, edema      Vital Signs Last 24 Hrs  T(C): 36.6 (03 Sep 2019 07:47), Max: 37.1 (02 Sep 2019 11:45)  T(F): 97.9 (03 Sep 2019 07:47), Max: 98.7 (02 Sep 2019 11:45)  HR: 62 (03 Sep 2019 07:47) (54 - 62)  BP: 123/54 (03 Sep 2019 07:47) (94/60 - 135/64)  BP(mean): --  RR: 18 (03 Sep 2019 07:47) (17 - 19)  SpO2: 93% (03 Sep 2019 07:47) (92% - 96%)    PHYSICAL EXAM:    General:  nad  HEENT:  NC/AT  Chest:  dec bs  Cardiovascular:  Regular rhythm, S1, S2  Abdomen:  Soft, non-tender, mild dt  Extremities:  no  edema  Skin:  No rash  Neuro/Psych:  A&Ox3      LABS:                        7.8    8.92  )-----------( 297      ( 03 Sep 2019 06:25 )             26.0     09-03    142  |  110<H>  |  88<H>  ----------------------------<  128<H>  4.9   |  19<L>  |  5.10<H>    Ca    8.2<L>      03 Sep 2019 06:25  Phos  6.3     09-03  Mg     1.9     09-03    TPro  6.2  /  Alb  2.5<L>  /  TBili  0.3  /  DBili  .10  /  AST  24  /  ALT  28  /  AlkPhos  87  09-03    PT/INR - ( 03 Sep 2019 06:25 )   PT: 14.1 sec;   INR: 1.24 ratio               RADIOLOGY & ADDITIONAL TESTS:

## 2019-09-03 NOTE — PROGRESS NOTE ADULT - ASSESSMENT
1.	CKD 4, CRS  2.	Dyspnea, Fluid overload  3.	Diabetes  4.	Hypertension  5.	Anemia    Stable creatinine. On PO lasix. Clinically improved.  Monitor blood sugar levels. Insulin coverage as needed. Dietary restriction.   Monitor BP trend. Titrate BP meds as needed. Salt restriction. Monitor h/h trend. Procrit for anemia. PO bicarb.   Will follow electrolytes and renal function trend. D/w family at bedside. D/c planning with close out pt follow up.

## 2019-09-03 NOTE — PROGRESS NOTE ADULT - SUBJECTIVE AND OBJECTIVE BOX
Patient is a 79y old  Female who presents with a chief complaint of CHF exaceration, SOB (30 Aug 2019 11:10)  Patient seen in follow up for CKD 4, SOB.     Feels better. No new complaints. Family at bedside.     PAST MEDICAL HISTORY:  CAD S/P percutaneous coronary angioplasty  Non-Hodgkins lymphoma  High cholesterol  DM (diabetes mellitus)  HTN (hypertension)  Renal failure    MEDICATIONS  (STANDING):  amLODIPine   Tablet 10 milliGRAM(s) Oral daily  aspirin enteric coated 81 milliGRAM(s) Oral daily  atorvastatin 80 milliGRAM(s) Oral at bedtime  chlorhexidine 2% Cloths 1 Application(s) Topical daily  dextrose 5%. 1000 milliLiter(s) (50 mL/Hr) IV Continuous <Continuous>  dextrose 50% Injectable 12.5 Gram(s) IV Push once  dextrose 50% Injectable 25 Gram(s) IV Push once  dextrose 50% Injectable 25 Gram(s) IV Push once  epoetin brett Injectable 43479 Unit(s) SubCutaneous <User Schedule>  furosemide    Tablet 40 milliGRAM(s) Oral daily  hydrALAZINE 50 milliGRAM(s) Oral three times a day  insulin lispro (HumaLOG) corrective regimen sliding scale   SubCutaneous three times a day before meals  insulin lispro (HumaLOG) corrective regimen sliding scale   SubCutaneous at bedtime  metoprolol tartrate 25 milliGRAM(s) Oral two times a day  pantoprazole    Tablet 40 milliGRAM(s) Oral before breakfast  sodium bicarbonate 650 milliGRAM(s) Oral two times a day    MEDICATIONS  (PRN):  dextrose 40% Gel 15 Gram(s) Oral once PRN Blood Glucose LESS THAN 70 milliGRAM(s)/deciliter  glucagon  Injectable 1 milliGRAM(s) IntraMuscular once PRN Glucose LESS THAN 70 milligrams/deciliter    T(C): 36.6 (09-03-19 @ 07:47), Max: 37.1 (09-02-19 @ 11:45)  HR: 62 (09-03-19 @ 07:47) (50 - 62)  BP: 123/54 (09-03-19 @ 07:47) (94/60 - 135/64)  RR: 18 (09-03-19 @ 07:47)  SpO2: 93% (09-03-19 @ 07:47)  Wt(kg): --  I&O's Detail    02 Sep 2019 07:01  -  03 Sep 2019 07:00  --------------------------------------------------------  IN:    Oral Fluid: 200 mL  Total IN: 200 mL    OUT:    Voided: 250 mL  Total OUT: 250 mL    Total NET: -50 mL          PHYSICAL EXAM:  General: NAD  Respiratory: b/l air entry  Cardiovascular: S1 S2  Gastrointestinal: soft  Extremities:  edema, left arm AVG, + bruit                   LABORATORY:                        7.8    8.92  )-----------( 297      ( 03 Sep 2019 06:25 )             26.0     09-03    142  |  110<H>  |  88<H>  ----------------------------<  128<H>  4.9   |  19<L>  |  5.10<H>    Ca    8.2<L>      03 Sep 2019 06:25  Phos  6.3     09-03  Mg     1.9     09-03    TPro  6.2  /  Alb  2.5<L>  /  TBili  0.3  /  DBili  .10  /  AST  24  /  ALT  28  /  AlkPhos  87  09-03    Sodium, Serum: 142 mmol/L (09-03 @ 06:25)  Sodium, Serum: 143 mmol/L (09-02 @ 08:33)    Potassium, Serum: 4.9 mmol/L (09-03 @ 06:25)  Potassium, Serum: 4.9 mmol/L (09-02 @ 08:33)    Hemoglobin: 7.8 g/dL (09-03 @ 06:25)  Hemoglobin: 7.6 g/dL (09-02 @ 08:33)  Hemoglobin: 7.9 g/dL (09-01 @ 08:25)  Hemoglobin: 7.9 g/dL (08-31 @ 20:40)    Creatinine, Serum 5.10 (09-03 @ 06:25)  Creatinine, Serum 5.00 (09-02 @ 08:33)  Creatinine, Serum 4.70 (09-01 @ 08:25)        LIVER FUNCTIONS - ( 03 Sep 2019 06:25 )  Alb: 2.5 g/dL / Pro: 6.2 g/dL / ALK PHOS: 87 U/L / ALT: 28 U/L / AST: 24 U/L / GGT: x

## 2019-09-03 NOTE — DISCHARGE NOTE PROVIDER - NSDCCPCAREPLAN_GEN_ALL_CORE_FT
PRINCIPAL DISCHARGE DIAGNOSIS  Diagnosis: CHF (congestive heart failure)  Assessment and Plan of Treatment: Acute on chroni diastolic heart failure  Continue Lasix as prescribed  F/u with your heart doctor as soon as possible to get work up, stress test etc.      SECONDARY DISCHARGE DIAGNOSES  Diagnosis: Acute on chronic renal failure  Assessment and Plan of Treatment: Acute on chronic renal failure  F/u with your kidney doctor for possible HD initiation

## 2019-09-03 NOTE — PROGRESS NOTE ADULT - REASON FOR ADMISSION
CHF exaceration, SOB
CHF exacerbation, SOB
CHF exaceration, SOB

## 2019-09-03 NOTE — DISCHARGE NOTE PROVIDER - HOSPITAL COURSE
78 yo F w/ PMH Non-Hodgkins lymphoma, High cholesterol, DM (on insulin),HTN, chronic Renal failure, angioplasty 2009, removal of R kidney and gall bladder 2011, chemo 2011 that presents to [facility] ED c/o SOB being admitted for SOB likely 2/2 to acute diastolic CHF exacerbation, bilat pleural effusions,  found to have anemia with recent stool for OB + as out patient, suspect GIB . Patient was seen by cardio and Pulm, as well as nephro. Was diuresed appropriately ans was switched to oral lasix. Renal function was monitored and stayed stable at 5.00. Patient needs cardiac work up and clearance prior to clearance for Coloscopy per Cardio here, that     patient will do after going back to her home country in [facility]. Nephro also cleared patient for discharge and f/u with her doctor in [facility] for  HD. Plan of care d/w all the consultants and patient and her daughter and her . I told them that as soon as she goes back to her home country see th doctor as soon as possible, in the meanwhile patient to stay off of Plavix but can continue baby aspirin per Cardio. Patient has no s/s of active bleeding, no melena. 78 yo F w/ PMH Non-Hodgkins lymphoma, High cholesterol, DM (on insulin),HTN, chronic Renal failure, angioplasty 2009, removal of R kidney and gall bladder 2011, chemo 2011 that presents to [facility] ED c/o SOB being admitted for SOB likely 2/2 to acute diastolic CHF exacerbation, bilat pleural effusions,  found to have anemia with recent stool for OB + as out patient, suspect GIB . Patient was seen by cardio and Pulm, as well as nephro. Was diuresed appropriately ans was switched to oral lasix. Renal function was monitored and stayed stable at 5.00. Patient needs cardiac work up and clearance prior to clearance for Coloscopy per Cardio here, that     patient will do after going back to her home country in [facility]. Nephro also cleared patient for discharge and f/u with her doctor in [facility] for  HD. Plan of care d/w all the consultants and patient and her daughter and her . I told them that as soon as she goes back to her home country see th doctor as soon as possible, in the meanwhile patient to stay off of Plavix but can continue baby aspirin per Cardio. Patient has no s/s of active bleeding, no melena.         Addendum: Patient's right OV line infiltrated, doppler + for Superficial DVT , no indication for AC, patient is not even a candidate for AC secondary to recent GIB. Advised patient to use warm compresses and f/u with her PCP.

## 2019-09-03 NOTE — PROGRESS NOTE ADULT - ASSESSMENT
80 y/o female current smoker with Hx of non-Hodgkin's lymphoma (last chemo in 2009), right kidney Ca s/p nephrectomy (2009), CAD s/p stents to the RCA and LAD x 2 (20019), HTN, DM2, HLD, and CKD stage 4 (s/p left AVG not has been used) presented to the ED c/o worsening SOB, MO, and orthopnea.     Diastolic Heart Failure  - Her volume status is significantly improved.  -Please keep accurate Intake and output  - Lasix changed to PO 40mg daily   - Daily standing weights only  - Monitor electrolytes, replete to keep K>4 and Mag>2  -2d echo as above revealing  LVEF 65%, moderate aortic stenosis  1.2 sq cm, mild to moderate MR, moderate TR, LARGE bilateral pleural effusion      CAD  - She has no acute anginal symptoms  - She is s/p stents (RCA, LAD x 2) in 2009  - Continue ASA  - Continue statin  - Mild troponin leak in setting of CKD and hypervolemia   - Outpatient ischemic workup      CKD  - Continue to monitor renal indices  - Avoid nephrotoxics  - She had an AV graft that has not been used yet  - Renal following.  Per Renal, no indication for HD right now.    HTN  - Continue Hydralazine 50mg q8H and Norvasc    HLD  - Continue statin    DM2    - FS AC and HS  - Care per Primary    Anemia   Anemia work up as per primary team      DVT ppx  - Per Primary      Mindy Young [facility]  Cardiology   Spectra #[phone] 80 y/o female current smoker with Hx of non-Hodgkin's lymphoma (last chemo in 2009), right kidney Ca s/p nephrectomy (2009), CAD s/p stents to the RCA and LAD x 2 (20019), HTN, DM2, HLD, and CKD stage 4 (s/p left AVG not has been used) presented to the ED c/o worsening SOB, MO, and orthopnea.     Diastolic Heart Failure  - Her volume status is significantly improved.  - Please keep accurate Intake and output  - Continue Lasix PO 40mg daily.  She takes 20 mg daily at home  - Daily standing weights only  - Monitor electrolytes, replete to keep K>4 and Mag>2  - Echo showed LVEF 65%, moderate aortic stenosis  1.2 sq cm, mild to moderate MR, moderate TR, LARGE bilateral pleural effusion    - Encourage ambulation.    CAD  - She has no acute anginal symptoms  - She is s/p stents (RCA, LAD x 2) in 2009  - Continue ASA  - Continue statin  - Mild troponin leak in setting of CKD and hypervolemia   - Outpatient ischemic workup as originally planned by her Cardiologist    CKD  - Continue to monitor renal indices.  Slowly worsening (5.2 <--5.1 <--4.7)  - Avoid nephrotoxics  - She had an AV graft that has not been used yet  - Renal following.  Per Renal, no indication for HD right now.  - Patient expresses wish to go home to the [facility]    HTN  - Continue Hydralazine 50mg q8H and Norvasc    HLD  - Continue statin    DM2  - FS AC and HS  - Care per Primary    Anemia   - Anemia work up as per primary team  - Likely anemia of chronic disease.  No evidence of overt bleeding    DVT ppx  - Per Primary    Mindy Young [facility]  Cardiology   Spectra #[phone]

## 2019-09-03 NOTE — PROGRESS NOTE ADULT - PROVIDER SPECIALTY LIST ADULT
Cardiology
Gastroenterology
Hospitalist
Hospitalist
Nephrology
Nephrology
Pulmonology
Nephrology
Nephrology
Cardiology
Cardiology
Nephrology
Hospitalist
Pulmonology
Hospitalist

## 2025-06-24 NOTE — ED ADULT NURSE NOTE - EENT ASSESSMENT, MLM
6/24/2025:  Surgical Follow-up Note    Ms. Frederick Bowers comes in today for follow up of peritoneal dialysis.  She has a history of renal failure and had a peritoneal dialysis catheter placed in the past.  She saw me recently with complaints of temporary catheter dysfunction, however this is since gotten better.  The cath is now functioning well and she has no complaints at all.  For unclear reasons she got a CT scan of the abdomen which apparently showed a small amount of fluid adjacent to the catheter, she is here to discuss the significance of this.    Physical Exam:  Her abdomen is soft and nondistended.  The catheter site is unremarkable.    Reports/Studies:  CT: Her scan was reviewed with the catheter shows the catheter to be in excellent position with there is a very small seroma along the catheter tract.    Impression:  Status post laparoscopic peritoneal dialysis catheter with small pericatheter seroma.    Plan:  She looks good.  The seroma needs no treatment, as it is small and completely nonsymptomatic.  Continue to use the catheter.  No surgical follow-up is needed unless she has a problem.    Time:  We did spend approximately 20 minutes on today's visit, including chart and study review, history and physical, counseling, as well as documentation and coordination of care.    Noel Damon MD  Date: 6/24/2025  Time: 1:13 PM   WDL